# Patient Record
Sex: FEMALE | Race: WHITE | NOT HISPANIC OR LATINO | Employment: FULL TIME | URBAN - METROPOLITAN AREA
[De-identification: names, ages, dates, MRNs, and addresses within clinical notes are randomized per-mention and may not be internally consistent; named-entity substitution may affect disease eponyms.]

---

## 2024-03-29 LAB
EXTERNAL ABO GROUPING: NORMAL
EXTERNAL ANTIBODY SCREEN: NORMAL
EXTERNAL CHLAMYDIA SCREEN: NEGATIVE
EXTERNAL GONORRHEA SCREEN: NEGATIVE
EXTERNAL HEMATOCRIT: 38.8 %
EXTERNAL HEMOGLOBIN: 13.3 G/DL
EXTERNAL HEPATITIS B SURFACE ANTIGEN: NORMAL
EXTERNAL HIV-1 AB: NORMAL
EXTERNAL HIV-1 P24 ANTIGEN: NORMAL
EXTERNAL HIV-2 AB: NORMAL
EXTERNAL PLATELET COUNT: 230 K/ÂΜL
EXTERNAL RH FACTOR: POSITIVE
EXTERNAL SYPHILIS TOTAL IGG/IGM SCREENING: NORMAL
HCV AB SER-ACNC: NORMAL

## 2024-05-02 ENCOUNTER — ULTRASOUND (OUTPATIENT)
Dept: OBGYN CLINIC | Facility: CLINIC | Age: 34
End: 2024-05-02
Payer: COMMERCIAL

## 2024-05-02 ENCOUNTER — TELEPHONE (OUTPATIENT)
Dept: OBGYN CLINIC | Facility: CLINIC | Age: 34
End: 2024-05-02

## 2024-05-02 VITALS
HEIGHT: 64 IN | SYSTOLIC BLOOD PRESSURE: 128 MMHG | WEIGHT: 168 LBS | BODY MASS INDEX: 28.68 KG/M2 | DIASTOLIC BLOOD PRESSURE: 80 MMHG

## 2024-05-02 DIAGNOSIS — Z3A.17 17 WEEKS GESTATION OF PREGNANCY: Primary | ICD-10-CM

## 2024-05-02 PROBLEM — Z78.9 CONCEIVED BY IN VITRO FERTILIZATION: Status: ACTIVE | Noted: 2024-05-02

## 2024-05-02 LAB
SL AMB  POCT GLUCOSE, UA: NORMAL
SL AMB POCT URINE PROTEIN: NORMAL

## 2024-05-02 PROCEDURE — 81002 URINALYSIS NONAUTO W/O SCOPE: CPT | Performed by: OBSTETRICS & GYNECOLOGY

## 2024-05-02 PROCEDURE — PNV: Performed by: OBSTETRICS & GYNECOLOGY

## 2024-05-02 RX ORDER — ASPIRIN 81 MG/1
81 TABLET, CHEWABLE ORAL DAILY
COMMUNITY

## 2024-05-02 NOTE — TELEPHONE ENCOUNTER
5/2/24 patient has an upcoming Intake appointment 5/6/24 in [place] 8:30 with nurse 18 weeks and Initial appointment with Georgie in [place] 5/15/24 at 9:30 am. Please note patient has records on phone and she did request MR to be faxed to us

## 2024-05-02 NOTE — PROGRESS NOTES
.[facility] - [place]  [address] [place] [address]    Assessment/Plan:  1. 17 weeks gestation of pregnancy  Assessment & Plan:  Pt needs OB intake visit  MFM referral given  [facility] ordered    Orders:  -     Alpha fetoprotein, maternal; Future  -     Ambulatory Referral to Maternal Fetal Medicine; Future; Expected date: 2024  -     Alpha fetoprotein, maternal  -     POCT urine dip        Subjective:   Vinay Jason is a 33 y.o.  .  CC:   Chief Complaint   Patient presents with    Routine Prenatal Visit    Initial Prenatal Visit     OB-17 weeks, SOB with high heart rate. Had brown blood spotting on        HPI: 34yo  @ 17wks by IVF dating presents for transfer of care appointment. She recently moved from [place]. She reports no complaints and is doing well.     ROS: Negative except as noted in HPI    Patient's last menstrual period was 2024.       She  reports that she is not currently sexually active and has had partner(s) who are male. She reports using the following method of birth control/protection: None.       The following portions of the patient's history were reviewed and updated as appropriate:   Past Medical History:   Diagnosis Date    Abnormal Pap smear of cervix     Normal paps  and      History reviewed. No pertinent surgical history.  Family History   Problem Relation Age of Onset    Thyroid disease Mother         Graves disease    Colon cancer Maternal Grandmother         Passed from colon cancer age approx 75     Social History     Socioeconomic History    Marital status: Single     Spouse name: None    Number of children: None    Years of education: None    Highest education level: None   Occupational History    None   Tobacco Use    Smoking status: Never    Smokeless tobacco: Never    Tobacco comments:     No history with tobacco   Substance and Sexual Activity    Alcohol use: Not Currently     Comment: No alcohol use since 2022     "Drug use: Never    Sexual activity: Not Currently     Partners: Male     Birth control/protection: None   Other Topics Concern    None   Social History Narrative    None     Social Determinants of Health     Financial Resource Strain: Not on file   Food Insecurity: Not on file   Transportation Needs: Not on file   Physical Activity: Not on file   Stress: Not on file   Social Connections: Not on file   Intimate Partner Violence: Not on file   Housing Stability: Not on file     Outpatient Medications Marked as Taking for the 5/2/24 encounter (Ultrasound) with Cookie LOPEZ DO   Medication    aspirin 81 mg chewable tablet    Doxylamine Succinate, Sleep, (UNISOM PO)    Prenatal Vit-Fe Fumarate-FA (PRENATAL 19 PO)     Allergies   Allergen Reactions    Penicillins Hives and Shortness Of Breath           Objective:  /80 (BP Location: Left arm, Patient Position: Sitting, Cuff Size: Standard)   Ht 5' 4\" (1.626 m)   Wt 76.2 kg (168 lb)   LMP 01/04/2024   BMI 28.84 kg/m²          General Appearance: alert and oriented, in no acute distress.   Abdomen: Soft, non-tender, non-distended, no masses, no rebound or guarding.  FHT: 156  Extremities: Normal range of motion.   Skin: normal, no rash or abnormalities  Neurologic: alert, oriented x3  Psychiatric: Appropriate affect, mood stable, cooperative with exam.        Cookie Kulkarni DO  5/2/2024 10:52 AM   "

## 2024-05-06 ENCOUNTER — TELEPHONE (OUTPATIENT)
Age: 34
End: 2024-05-06

## 2024-05-06 ENCOUNTER — INITIAL PRENATAL (OUTPATIENT)
Dept: OBGYN CLINIC | Facility: CLINIC | Age: 34
End: 2024-05-06

## 2024-05-06 ENCOUNTER — TELEPHONE (OUTPATIENT)
Dept: OBGYN CLINIC | Facility: CLINIC | Age: 34
End: 2024-05-06

## 2024-05-06 ENCOUNTER — PATIENT OUTREACH (OUTPATIENT)
Dept: OBGYN CLINIC | Facility: CLINIC | Age: 34
End: 2024-05-06

## 2024-05-06 VITALS
HEIGHT: 64 IN | BODY MASS INDEX: 28.44 KG/M2 | DIASTOLIC BLOOD PRESSURE: 60 MMHG | SYSTOLIC BLOOD PRESSURE: 122 MMHG | WEIGHT: 166.6 LBS

## 2024-05-06 DIAGNOSIS — Z34.82 PRENATAL CARE, SUBSEQUENT PREGNANCY, SECOND TRIMESTER: ICD-10-CM

## 2024-05-06 DIAGNOSIS — Z3A.18 18 WEEKS GESTATION OF PREGNANCY: Primary | ICD-10-CM

## 2024-05-06 PROCEDURE — NOBC: Performed by: NURSE PRACTITIONER

## 2024-05-06 NOTE — PROGRESS NOTES
SW referred for initial prenatal assessment. Patient is a , 17w4d transfer from [facility] with an RY[place] of 10/10/24. Patient agreeable to meeting with SW today.    Patient reports this is a planned pregnancy (IVF - donor sperm). She is expecting a girl. Patient recently moved to [place] with her mom and daughter. Patient works full time and drives. Patient denies needing information for MA/WIC/SNAP, parenting education, or baby supply resources today.    Patient denies current or h/o substance use, DV/IPV, CYS involvement, and legal concerns. Denies having a formal mental health diagnosis but has struggled with anxiety, particularly after birth of her daughter. Reports feeling well this pregnancy and denies needing mental health support today, but SW and patient discussed options postpartum if needed. Patient indicates good social support from her mom and friends. Enjoys walking outside and gardening for stress relief.     No SW needs identified at this time, SW closing referral. Please re-refer as needed.

## 2024-05-06 NOTE — TELEPHONE ENCOUNTER
Left message patient's voicemail re: needs prenatal labs not done with initial labs (Rubella, U/A, urine C&S) - orders in chart for [facility].  Can verify lab with patient & fax to lab or patient.

## 2024-05-06 NOTE — PROGRESS NOTES
OB INTAKE INTERVIEW  Patient is 33 y.o.y.o. who presents for OB intake at 17 wks 4 days  She is accompanied by herself during this encounter  The father of her baby unknown (IVF pregnancy with donor sperm) is not involved in the pregnancy and is 28 years old.      Last Menstrual Period: 2024  Ultrasound: Measured - awaiting IVF records - patient brought records from OB - had appts 3/1/2024 & 3/29/2024 ([place], [place])  Estimated Date of Delivery: 10/10/2024 confirmed by 1st trimester US     Signs/Symptoms of Pregnancy  Current pregnancy symptoms: nausea, vomiting 2x/wk with taking Unisom  Reviewed dietary recommendations in pregnancy - patient does not eat red meat  Constipation no  Headaches no  Cramping/spotting YES - spotting @ 7-9 wks  PICA cravings no    Diabetes-  There is no height or weight on file to calculate BMI.  If patient has 1 or more, please order early 1 hour GTT  History of GDM no  BMI >35 no  History of PCOS or current metformin use no  History of LGA/macrosomic infant (4000g/9lbs) no    If patient has 2 or more, please order early 1 hour GTT  BMI>30 no  AMA no  First degree relative with type 2 diabetes no  History of chronic HTN, hyperlipidemia, elevated A1C no  High risk race (, , ,  or ) no    Hypertension- if you answer yes to any of the following, please order baseline preeclampsia labs (cbc, comprehensive metabolic panel, urine protein creatinine ratio, uric acid)  History of of chronic HTN no  History of gestational HTN no  History of preeclampsia, eclampsia, or HELLP syndrome no  History of diabetes no  History of lupus, autoimmune disease, kidney disease no    Thyroid- if yes order TSH with reflex T4  History of thyroid disease no    Bleeding Disorder or Hx of DVT-patient or first degree relative with history of. Order the following if not done previously.   (Factor V, antithrombin III, prothrombin gene mutation, protein  C and S Ag, lupus anticoagulant, anticardiolipin, beta-2 glycoprotein)   no    OB/GYN-  History of abnormal pap smear YES       Date of last pap smear 2024 - wnl, reactive changes, (-) HPV  History of HPV YES  History of Herpes/HSV no  History of other STI (gonorrhea, chlamydia, trich) no  History of prior  YES  History of prior  no  History of  delivery prior to 36 weeks 6 days no  History of blood transfusion no  Ok for blood transfusion YES    Substance screening-   History of tobacco use no  Currently using tobacco no  Substance Use Screen Level (N/A, LOW, HIGH) N/A    MRSA Screening-   Does the pt have a hx of MRSA? no    Immunizations:  Influenza vaccine given this season YES  Discussed Tdap vaccine YES  Discussed COVID Vaccine had Covid vaccine 2023    Genetic/MFM-  Do you or your partner have a history of any of the following in yourselves or first degree relatives?  Cystic fibrosis no  Spinal muscular atrophy no  Hemoglobinopathy/Sickle Cell/Thalassemia no  Fragile X Intellectual Disability no    If yes, discuss Carrier Screening and recommend consultation with MFM/Genetic Counseling and place specific [facility] Referral for.    If no, discuss Carrier Screening being completed once in a lifetime as a standard of care lab test. Place orders for Cystic Fibrosis Gene Test ([id number]) and Spinal Muscular Atrophy DNA ([id number])      Appointment for Nuchal Translucency Ultrasound at [facility] scheduled for - patient did not have NT US - had NIPT      Interview education  [facility] Pregnancy Essentials Book reviewed, discussed and attached to their AVS YES    referral to social work placed YES    Prenatal lab work scripts NO (initial prenatal labs previously done)  Extra labs ordered:  Has lab order for MSAFP    Aspirin/Preeclampsia Screen    Risk Level Risk Factor Recommendation   LOW Prior Uncomplicated full-term delivery YES No Aspirin recommendation        MODERATE Nulliparity no Recommend  low-dose aspirin if     BMI>30 no 2 or more moderate risk factors    Family History Preeclampsia (mother/sister) no     35yr old or greater no      or Low Socioeconomic no     IVF Pregnancy  YES     Personal History Risks (low birth weight, prior adverse preg outcome, >10yr preg interval) no         HIGH History of Preeclampsia no Recommend low-dose aspirin if     Multifetal gestation no 1 or more high risk factors    Chronic HTN no     Type 1 or 2 Diabetes no     Renal Disease no     Autoimmune Disease  no      Contraindications to ASA therapy:  NSAID/ ASA allergy: no  Nasal polyps: no  Asthma with history of ASA induced bronchospasm: no  Relative contraindications:  History of GI bleed: no  Active peptic ulcer disease: no  Severe hepatic dysfunction: no    Patient should be recommended to take ASA 162mg during this pregnancy from 12-36wks to lower her risk of preeclampsia: patient is currently taking LDASA 81 mg/day from previous provider- will follow up with provider next appt      The patient has a history now or in prior pregnancy notable for:  - transfer @ 17 wk  - IVF pregnancy - transfer date 1/24/2024 - EDC 10/10?2024   -1st preg IUI, this pregnancy IVF with same donor sperm (donor age 28) - patient brought records from previous OB provider, awaiting records from IVF  - had 1st trimester bleeding 7-9 wks - subchorionic bleeding - resolved  - had initial prenatal labs done 3/29/2024 - blood type/Rh is O POS, NEGATIVE VARICELLA  - patient will schedule detailed US @ [facility] for 18-20 wk  - will have MSAFP done (has lab order)  - PCN allergy  - patient will establish with dentist in this area - last dental cleaning 2 yrs ago.      Details that I feel the provider should be aware of: see above  - patient has recently moved to this area from [facility] - lives with her mother (her support person) - has 18 month old daughter  - review of prenatal records - NO RUBELLA, U/A, urine culture done  with initial prenatal labs - ordered ([facility])    PN1 visit scheduled. The patient was oriented to our practice, the navigator role, reviewed delivering physicians and [facility] for Delivery. All questions were answered.    Interviewed by: JACK Prieto RN

## 2024-05-06 NOTE — PATIENT INSTRUCTIONS
Congratulation!! Please review our Pregnancy Essential Guide and [facility] L&D Virtual tour from our networks website.     [facility] Pregnancy Essentials Guide  [facility] ([email or web address])     [facility] - Virtual Tour ([employer])

## 2024-05-07 NOTE — TELEPHONE ENCOUNTER
Left message patient's voicemail re: labs not done with initial prenatal labs previous OB - mailed lab order for U/A, urine C7S & Rubella to patient.

## 2024-05-15 ENCOUNTER — INITIAL PRENATAL (OUTPATIENT)
Dept: OBGYN CLINIC | Facility: CLINIC | Age: 34
End: 2024-05-15
Payer: COMMERCIAL

## 2024-05-15 VITALS — DIASTOLIC BLOOD PRESSURE: 60 MMHG | WEIGHT: 168.6 LBS | BODY MASS INDEX: 28.94 KG/M2 | SYSTOLIC BLOOD PRESSURE: 110 MMHG

## 2024-05-15 DIAGNOSIS — Z3A.18 18 WEEKS GESTATION OF PREGNANCY: Primary | ICD-10-CM

## 2024-05-15 DIAGNOSIS — Z78.9 CONCEIVED BY IN VITRO FERTILIZATION: ICD-10-CM

## 2024-05-15 LAB
SL AMB  POCT GLUCOSE, UA: NEGATIVE
SL AMB POCT URINE PROTEIN: NEGATIVE

## 2024-05-15 PROCEDURE — PNV: Performed by: NURSE PRACTITIONER

## 2024-05-15 PROCEDURE — 81002 URINALYSIS NONAUTO W/O SCOPE: CPT | Performed by: NURSE PRACTITIONER

## 2024-05-15 RX ORDER — CETIRIZINE HYDROCHLORIDE 10 MG/1
10 TABLET ORAL AS NEEDED
COMMUNITY

## 2024-05-15 NOTE — PROGRESS NOTES
Routine Prenatal Visit  [facility] - [place]  [address], [place] [address]    Assessment/Plan:  Vinay is a 33 y.o. year old  at 18w6d who presents for routine prenatal visit.     1. 18 weeks gestation of pregnancy  -     POCT urine dip  2. Conceived by in vitro fertilization    Doing well  Anatomy scan scheduled  Discussed AFP, also needs Rubella and urine culture. Requisition reprinted. Pt will to lab go this week.  Call with any concerns  Next OB Visit 4 weeks.    Subjective:     CC: Prenatal care    Vinay Jason is a 33 y.o.  female who presents for routine prenatal care at 18w6d. She is doing well. Having allergies to environment since moving from [place], taking Zyrtec.   Pregnancy ROS: no leakage of fluid, pelvic pain, or vaginal bleeding.  no fetal movement yet.     The following portions of the patient's history were reviewed and updated as appropriate: allergies, current medications, past family history, past medical history, obstetric history, gynecologic history, past social history, past surgical history and problem list.      Objective:  /60   Wt 76.5 kg (168 lb 9.6 oz)   LMP 2024   BMI 28.94 kg/m²   Pregravid Weight/BMI: 77.1 kg (170 lb) (BMI 29.17)  Current Weight: 76.5 kg (168 lb 9.6 oz)   Total Weight Gain: -0.635 kg (-1 lb 6.4 oz)   Pre- Vitals      Flowsheet Row Most Recent Value   Prenatal Assessment    Prenatal Vitals    Blood Pressure 110/60   Weight - Scale 76.5 kg (168 lb 9.6 oz)   Urine Albumin/Glucose    Dilation/Effacement/Station    Cervical Dilation 0   Cervical Effacement 0   Vaginal Drainage    Draining Fluid No   Edema    LLE Edema None   RLE Edema Trace             General: Well appearing, no distress  Abdomen: Soft, gravid, nontender.  .  Extremities: Non tender.

## 2024-05-18 NOTE — PROGRESS NOTES
Please refer to the [facility] ultrasound report in Ob Procedures for additional information regarding today's visit

## 2024-05-20 ENCOUNTER — ROUTINE PRENATAL (OUTPATIENT)
Dept: PERINATAL CARE | Facility: OTHER | Age: 34
End: 2024-05-20
Payer: COMMERCIAL

## 2024-05-20 VITALS
HEIGHT: 64 IN | DIASTOLIC BLOOD PRESSURE: 68 MMHG | HEART RATE: 91 BPM | WEIGHT: 169 LBS | BODY MASS INDEX: 28.85 KG/M2 | SYSTOLIC BLOOD PRESSURE: 106 MMHG

## 2024-05-20 DIAGNOSIS — Z3A.19 19 WEEKS GESTATION OF PREGNANCY: ICD-10-CM

## 2024-05-20 DIAGNOSIS — O09.812 PREGNANCY RESULTING FROM IN VITRO FERTILIZATION IN SECOND TRIMESTER: Primary | ICD-10-CM

## 2024-05-20 DIAGNOSIS — Z3A.17 17 WEEKS GESTATION OF PREGNANCY: ICD-10-CM

## 2024-05-20 DIAGNOSIS — Z36.86 ENCOUNTER FOR ANTENATAL SCREENING FOR CERVICAL LENGTH: ICD-10-CM

## 2024-05-20 PROCEDURE — 76811 OB US DETAILED SNGL FETUS: CPT | Performed by: OBSTETRICS & GYNECOLOGY

## 2024-05-20 PROCEDURE — 76817 TRANSVAGINAL US OBSTETRIC: CPT | Performed by: OBSTETRICS & GYNECOLOGY

## 2024-05-20 PROCEDURE — 99204 OFFICE O/P NEW MOD 45 MIN: CPT | Performed by: OBSTETRICS & GYNECOLOGY

## 2024-05-20 NOTE — LETTER
May 20, 2024     Cookie Kulkarni V,   [address] [address]    Patient: Vinay Jason   YOB: 1990   Date of Visit: 5/20/2024       Dear Dr. Kulkarni:    Thank you for referring Vinay Jason to me for evaluation. Below are my notes for this consultation.    If you have questions, please do not hesitate to call me. I look forward to following your patient along with you.         Sincerely,        Gio Bass MD        CC: No Recipients    Gio Bass MD  5/20/2024 10:44 AM  Sign when Signing Visit  Please refer to the [facility] ultrasound report in Ob Procedures for additional information regarding today's visit

## 2024-05-20 NOTE — PROGRESS NOTES
Ultrasound Probe Disinfection    A transvaginal ultrasound was performed.   Prior to use, disinfection was performed with High Level Disinfection Process (Telnicon).  Probe serial number [id number] was used.      Magrie Calvert  05/20/24  10:05 AM

## 2024-05-22 LAB — EXTERNAL RUBELLA IGG QUANTITATION: NORMAL

## 2024-05-23 LAB
APPEARANCE UR: CLEAR
BACTERIA UR CULT: NORMAL
BACTERIA URNS QL MICRO: NORMAL
BILIRUB UR QL STRIP: NEGATIVE
CASTS URNS QL MICRO: NORMAL /LPF
COLOR UR: YELLOW
EPI CELLS #/AREA URNS HPF: NORMAL /HPF (ref 0–10)
GLUCOSE UR QL: NEGATIVE
HGB UR QL STRIP: NEGATIVE
KETONES UR QL STRIP: NEGATIVE
LEUKOCYTE ESTERASE UR QL STRIP: NEGATIVE
Lab: NORMAL
MICRO URNS: ABNORMAL
MICRO URNS: ABNORMAL
NITRITE UR QL STRIP: NEGATIVE
PH UR STRIP: 8 [PH] (ref 5–7.5)
PROT UR QL STRIP: NEGATIVE
RBC #/AREA URNS HPF: NORMAL /HPF (ref 0–2)
RUBV IGG SERPL IA-ACNC: 4.16 INDEX
SP GR UR: 1.01 (ref 1–1.03)
UROBILINOGEN UR STRIP-ACNC: 0.2 MG/DL (ref 0.2–1)
WBC #/AREA URNS HPF: NORMAL /HPF (ref 0–5)

## 2024-05-24 LAB
2ND TRIMESTER 4 SCREEN SERPL-IMP: NORMAL
AFP ADJ MOM SERPL: 1.14
AFP INTERP AMN-IMP: NORMAL
AFP INTERP SERPL-IMP: NORMAL
AFP INTERP SERPL-IMP: NORMAL
AFP SERPL-MCNC: 60.9 NG/ML
AGE AT DELIVERY: 33.3 YR
GA METHOD: NORMAL
GA: 19.9 WEEKS
IDDM PATIENT QL: NO
MULTIPLE PREGNANCY: NO
NEURAL TUBE DEFECT RISK FETUS: 7850 %

## 2024-06-11 ENCOUNTER — ROUTINE PRENATAL (OUTPATIENT)
Dept: OBGYN CLINIC | Facility: CLINIC | Age: 34
End: 2024-06-11
Payer: COMMERCIAL

## 2024-06-11 VITALS
SYSTOLIC BLOOD PRESSURE: 108 MMHG | HEIGHT: 64 IN | WEIGHT: 170.6 LBS | DIASTOLIC BLOOD PRESSURE: 66 MMHG | BODY MASS INDEX: 29.12 KG/M2

## 2024-06-11 DIAGNOSIS — Z78.9 CONCEIVED BY IN VITRO FERTILIZATION: ICD-10-CM

## 2024-06-11 DIAGNOSIS — Z3A.22 22 WEEKS GESTATION OF PREGNANCY: Primary | ICD-10-CM

## 2024-06-11 LAB
SL AMB  POCT GLUCOSE, UA: NORMAL
SL AMB POCT URINE PROTEIN: NORMAL

## 2024-06-11 PROCEDURE — PNV: Performed by: OBSTETRICS & GYNECOLOGY

## 2024-06-11 PROCEDURE — 81002 URINALYSIS NONAUTO W/O SCOPE: CPT | Performed by: OBSTETRICS & GYNECOLOGY

## 2024-06-11 NOTE — PROGRESS NOTES
"Routine Prenatal Visit  [facility] - [address], [address], [place] [address]    Assessment/Plan:  Vinay is a 34 y.o. year old  at 22w5d who presents for routine prenatal visit.     1. 22 weeks gestation of pregnancy  -     POCT urine dip  2. Conceived by in vitro fertilization  Comments:  fetal echo  scheduled    + fm off an on.  Reviewed with pt  eat and  quiet time and  see if movement present.    US reviewed   has fetal echo scheduled.    No bleeding     Subjective:     CC: Prenatal care    Vinay Jason is a 34 y.o.  female who presents for routine prenatal care at 22w5d.  Pregnancy ROS: no  leakage of fluid, pelvic pain, or vaginal bleeding.  +  fetal movement.    The following portions of the patient's history were reviewed and updated as appropriate: allergies, current medications, past family history, past medical history, obstetric history, gynecologic history, past social history, past surgical history and problem list.      Objective:  /66 (BP Location: Left arm, Patient Position: Sitting, Cuff Size: Standard)   Ht 5' 4\" (1.626 m)   Wt 77.4 kg (170 lb 9.6 oz)   LMP 2024   BMI 29.28 kg/m²   Pregravid Weight/BMI: 77.1 kg (170 lb) (BMI 29.17)  Current Weight: 77.4 kg (170 lb 9.6 oz)   Total Weight Gain: 0.272 kg (9.6 oz)   Pre- Vitals    Flowsheet Row Most Recent Value   Prenatal Assessment    Fetal Heart Rate 144   Fundal Height (cm) 23 cm   Prenatal Vitals    Blood Pressure 108/66   Weight - Scale 77.4 kg (170 lb 9.6 oz)   Urine Albumin/Glucose    Dilation/Effacement/Station    Vaginal Drainage    Draining Fluid No   Edema    LLE Edema None   RLE Edema None   Facial Edema None           General: Well appearing, no distress  Respiratory: Unlabored breathing  Cardiovascular: Regular rate.  Abdomen: Soft, gravid, nontender  Fundal Height: Appropriate for gestational age.  Extremities: Warm and well perfused.  Non tender.  "

## 2024-06-11 NOTE — PATIENT INSTRUCTIONS
NUTRITION IN PREGNANCY  Good Nutrition is a VERY important part of having a healthy pregnancy and healthy baby.  You should follow a healthy diet which include the following:   * Vegetables (which are dark green and leafy): at least 2 servings each day   * Protein (meat, eggs, beans, nuts, peanut butter): 3-4 servings each day   * Breads/whole grains (bread, pasta, rice, tortillas, potatoes): 3 servings each day   * Dairy (milk, yogurt, cheese): 3-4 servings each day   * Water: 6-8 glasses per day   * Calories: approximately 2000 to 2200 calories per day     WEIGHT GAIN   Recommended weight gain for you during your pregnancy is based on your body mass index (BMI) at the time that you became pregnant.   Pre-pregnant BMI Recommended weight gain   Underweight (BMI less than 18.5) 28 to 40 pounds   Normal weight (BMI 18.5-24.9) 25 to 35 pounds   Overweight (BMI 25-29.9) 15 to 25 pounds   Obese (BMI 30 or greater) 11 to 20 pounds     FOOD SAFETY   It is VERY important to eat only safely-prepared foods during pregnancy as you and your baby have a higher risk than usual for being affected by foodborne illnesses.  Follow these steps to ensure that you and your baby are safe from foodborne illnesses while you are pregnant:   wash hands thoroughly with warm water and soap before and after handling any foods   wash cutting boards, dishes, utensils, and countertops with hot water and soap before and after preparing any foods   rinse raw fruits and vegetables thoroughly under running water before eating   keep raw meat and seafood separate from other foods and use different cutting boards/utensils to handle raw meat than for other foods   put cooked food on a freshly clean plate   cook all of your foods thoroughly   discard foods that have been left out for more than 2 hours   refrigerate or freeze any foods than can spoil     There are three particular foodborne risks that you should be aware of and avoid as they can cause  serious harm to your unborn child.     * Listeria (a harmful bacteria)   don’t eat hot dogs or deli meats (unless they’re reheated until steaming hot)   don’t eat soft cheeses (such as Feta, Brie, Camembert) unless they are specifically labeled as being “made with pasteurized milk”   don’t drink raw (unpasteurized) milk   don’t eat refrigerated pates or meat spreads   don’t eat refrigerated smoked seafood unless it’s in a cooked dish like a casserole     * Mercury (a metal which is found in certain fish in high levels)   don’t eat shark, tilefish, chasity mackerel, or swordfish   don’t eat more than 12 ounces per week of shrimp, salmon, pollock, or catfish   when eating tuna fish, you can have up to 6 ounces per week of canned albacore tuna OR up to 12 ounces of canned light tuna     * Toxoplasma (a harmful parasite)   cook meat thoroughly before eating   wear gloves when gardening or handling sand from a child’s sandbox   if you ha tve a cat, have someone else change the litter box while you are pregnant.    if you HAVE to clean it yourself, be sure to wash your hands thoroughly afterwards with warm water and soap.   don’t get a NEW cat while you are pregnant

## 2024-06-13 ENCOUNTER — ROUTINE PRENATAL (OUTPATIENT)
Dept: PERINATAL CARE | Facility: OTHER | Age: 34
End: 2024-06-13
Payer: COMMERCIAL

## 2024-06-13 VITALS
DIASTOLIC BLOOD PRESSURE: 50 MMHG | BODY MASS INDEX: 29.16 KG/M2 | HEIGHT: 64 IN | WEIGHT: 170.8 LBS | HEART RATE: 84 BPM | SYSTOLIC BLOOD PRESSURE: 90 MMHG

## 2024-06-13 DIAGNOSIS — O09.812 PREGNANCY RESULTING FROM IN VITRO FERTILIZATION IN SECOND TRIMESTER: Primary | ICD-10-CM

## 2024-06-13 DIAGNOSIS — Z3A.23 23 WEEKS GESTATION OF PREGNANCY: ICD-10-CM

## 2024-06-13 PROCEDURE — 76827 ECHO EXAM OF FETAL HEART: CPT | Performed by: OBSTETRICS & GYNECOLOGY

## 2024-06-13 PROCEDURE — 76825 ECHO EXAM OF FETAL HEART: CPT | Performed by: OBSTETRICS & GYNECOLOGY

## 2024-06-13 PROCEDURE — 99213 OFFICE O/P EST LOW 20 MIN: CPT | Performed by: OBSTETRICS & GYNECOLOGY

## 2024-06-13 PROCEDURE — 93325 DOPPLER ECHO COLOR FLOW MAPG: CPT | Performed by: OBSTETRICS & GYNECOLOGY

## 2024-06-13 NOTE — PROGRESS NOTES
The patient was seen today for an ultrasound.  Please see ultrasound report (located under Ob Procedures) for additional details.   Thank you very much for allowing us to participate in the care of this very nice patient.  Should you have any questions, please do not hesitate to contact me.     Jorje Acosta MD FACOG  Attending Physician, Maternal-Fetal Medicine  [facility]

## 2024-07-09 ENCOUNTER — ROUTINE PRENATAL (OUTPATIENT)
Dept: OBGYN CLINIC | Facility: CLINIC | Age: 34
End: 2024-07-09
Payer: COMMERCIAL

## 2024-07-09 VITALS
SYSTOLIC BLOOD PRESSURE: 108 MMHG | DIASTOLIC BLOOD PRESSURE: 72 MMHG | HEIGHT: 64 IN | BODY MASS INDEX: 29.74 KG/M2 | WEIGHT: 174.2 LBS

## 2024-07-09 DIAGNOSIS — Z36.89 ENCOUNTER FOR OTHER SPECIFIED ANTENATAL SCREENING: ICD-10-CM

## 2024-07-09 DIAGNOSIS — O09.812 PREGNANCY RESULTING FROM IN VITRO FERTILIZATION IN SECOND TRIMESTER: Primary | ICD-10-CM

## 2024-07-09 DIAGNOSIS — R00.2 INTERMITTENT PALPITATIONS: ICD-10-CM

## 2024-07-09 DIAGNOSIS — Z3A.26 26 WEEKS GESTATION OF PREGNANCY: ICD-10-CM

## 2024-07-09 PROBLEM — Z34.90 PREGNANCY WITH PRENATAL CARE ELSEWHERE: Status: ACTIVE | Noted: 2024-07-09

## 2024-07-09 PROBLEM — O09.819 PREGNANCY RESULTING FROM IN VITRO FERTILIZATION: Status: ACTIVE | Noted: 2024-06-13

## 2024-07-09 LAB
SL AMB  POCT GLUCOSE, UA: NORMAL
SL AMB POCT URINE PROTEIN: NORMAL

## 2024-07-09 PROCEDURE — PNV: Performed by: STUDENT IN AN ORGANIZED HEALTH CARE EDUCATION/TRAINING PROGRAM

## 2024-07-09 PROCEDURE — 81002 URINALYSIS NONAUTO W/O SCOPE: CPT | Performed by: STUDENT IN AN ORGANIZED HEALTH CARE EDUCATION/TRAINING PROGRAM

## 2024-07-09 NOTE — ASSESSMENT & PLAN NOTE
- Patient reports intermittent palpitations to the 150s, which she feels are triggered by certain foods. She reports associated breathlessness with these episodes.   - Given frequent palpitations, recommend consultation with Cardiology. Referral provided today.   - Discussed that if she has chest pain or near-syncope with these episodes, she should proceed to ER for evaluation.

## 2024-07-09 NOTE — ASSESSMENT & PLAN NOTE
- PTL/PPROM/Bleeding precautions given.   - Kick counts were discussed at length. Fetal movement was perceived while in office today.   - MFM consult report from level II ultrasound reviewed. Repeat US is scheduled in 3rd trimester, per MFM recommendations.  - 28 week labs ordered, lab requisition provided to patient. Patient is Rh positive.  - Problem list updated, results console reviewed and updated with pertinent prenatal labs.  - PMH, PSH, medications reviewed and updated as needed.  - Return to office in 2 wk(s) for routine prenatal care

## 2024-07-09 NOTE — PROGRESS NOTES
Routine Prenatal Visit  [facility] - [place]  [address] [place] [address]    Assessment/Plan:  Vinay is a 34 y.o. year old  at 26w5d who presents for routine prenatal visit.     1. Pregnancy resulting from in vitro fertilization in second trimester  Assessment & Plan:  - Contine low dose ASA until 36 weeks for pre-eclampsia risk reduction.   - Normal fetal echo  - Plan for 3rd trimester growth US and weekly APFS beginning at 36 weeks.       2. Intermittent palpitations  Assessment & Plan:  - Patient reports intermittent palpitations to the 150s, which she feels are triggered by certain foods. She reports associated breathlessness with these episodes.   - Given frequent palpitations, recommend consultation with Cardiology. Referral provided today.   - Discussed that if she has chest pain or near-syncope with these episodes, she should proceed to ER for evaluation.   Orders:  -     Ambulatory Referral to Cardiology; Future  3. 26 weeks gestation of pregnancy  Assessment & Plan:  - PTL/PPROM/Bleeding precautions given.   - Kick counts were discussed at length. Fetal movement was perceived while in office today.   - MFM consult report from level II ultrasound reviewed. Repeat US is scheduled in 3rd trimester, per MFM recommendations.  - 28 week labs ordered, lab requisition provided to patient. Patient is Rh positive.  - Problem list updated, results console reviewed and updated with pertinent prenatal labs.  - PMH, PSH, medications reviewed and updated as needed.  - Return to office in 2 wk(s) for routine prenatal care    Orders:  -     POCT urine dip  -     Ambulatory Referral to Cardiology; Future  4. Encounter for other specified  screening  -     Glucose, 1H PG; Future  -     CBC; Future  -     RPR, Rfx Qn RPR/Confirm TP; Future  -     Glucose, 1H PG  -     CBC  -     RPR, Rfx Qn RPR/Confirm TP        Subjective:   Vinay Jason is a 34 y.o.  who presents for routine  "prenatal care at 26w5d.  Complaints today: Subjective change in quality of fetal movements. Frequent palpitations with HR as high as 150. She reports that these episodes are associated with certain foods and cold water.   LOF: No; VB: No; Contractions: No; FM: Present    Objective:  /72 (BP Location: Left arm, Patient Position: Sitting, Cuff Size: Standard)   Ht 5' 4\" (1.626 m)   Wt 79 kg (174 lb 3.2 oz)   LMP 2024   BMI 29.90 kg/m²     General: Well appearing, no distress  Respiratory: Unlabored breathing  Cardiovascular: Regular rate.  Abdomen: Soft, gravid, nontender  Extremities: Warm and well perfused.  Non tender.    Pregravid Weight/BMI: 77.1 kg (170 lb) (BMI 29.17)  Current Weight: 79 kg (174 lb 3.2 oz)   Total Weight Gain: 1.905 kg (4 lb 3.2 oz)     Pre-Real Vitals      Flowsheet Row Most Recent Value   Prenatal Assessment    Fetal Heart Rate 150   Fundal Height (cm) 26 cm   Movement Present   Prenatal Vitals    Blood Pressure 108/72   Weight - Scale 79 kg (174 lb 3.2 oz)   Urine Albumin/Glucose    Dilation/Effacement/Station    Vaginal Drainage    Draining Fluid No   Edema    LLE Edema None   RLE Edema None   Facial Edema None             Santosh Masters MD  2024 9:39 AM   "

## 2024-07-09 NOTE — ASSESSMENT & PLAN NOTE
- Contine low dose ASA until 36 weeks for pre-eclampsia risk reduction.   - Normal fetal echo  - Plan for 3rd trimester growth US and weekly APFS beginning at 36 weeks.

## 2024-07-16 ENCOUNTER — HOSPITAL ENCOUNTER (OUTPATIENT)
Facility: HOSPITAL | Age: 34
Discharge: HOME | End: 2024-07-16
Attending: OBSTETRICS & GYNECOLOGY | Admitting: OBSTETRICS & GYNECOLOGY
Payer: COMMERCIAL

## 2024-07-16 VITALS
RESPIRATION RATE: 18 BRPM | OXYGEN SATURATION: 96 % | TEMPERATURE: 98.4 F | HEART RATE: 90 BPM | DIASTOLIC BLOOD PRESSURE: 71 MMHG | SYSTOLIC BLOOD PRESSURE: 108 MMHG

## 2024-07-16 PROBLEM — O36.8121 DECREASED FETAL MOVEMENT AFFECTING MANAGEMENT OF PREGNANCY IN SECOND TRIMESTER, FETUS 1: Status: ACTIVE | Noted: 2024-07-16

## 2024-07-16 PROCEDURE — NC001 PR NO CHARGE: Performed by: OBSTETRICS & GYNECOLOGY

## 2024-07-16 PROCEDURE — 59025 FETAL NON-STRESS TEST: CPT | Performed by: OBSTETRICS & GYNECOLOGY

## 2024-07-16 PROCEDURE — 99213 OFFICE O/P EST LOW 20 MIN: CPT

## 2024-07-17 NOTE — H&P
L&D Triage Note - OB/GYN  Vinay Jason 34 y.o. female MRN: [id number]  Unit/Bed#:  TRIAGE 2 Encounter: [id number]    Vinay Jason is a patient of [facility]    SUBJECTIVE    RYLAND: Estimated Date of Delivery: 10/10/24    HPI:  34 y.o.  27w5d presents with complaint of decreased fetal movement since this afternoon..     Contractions: none  Leakage of fluid: none  Vaginal Bleeding: none   Fetal movement: present but significantly less    Her current obstetrical history is significant for transfer of care at 17 weeks, IVF frozen cycle      ROS:  Constitutional: Negative for fever/chills/headache  Respiratory: Negative for cough/cold/SOB  Cardiovascular: Negative for palpitations/lower extremity edema    Gastrointestinal: Negative for nausea/vomit/diarrhea    OBJECTIVE:  /71 (BP Location: Right arm)   Pulse 90   Temp 98.4 °F (36.9 °C) (Oral)   Resp 18   LMP 2024   SpO2 96%   There is no height or weight on file to calculate BMI.    Physical Exam  Vitals and nursing note reviewed.   Constitutional:       General: She is not in acute distress.     Appearance: Normal appearance. She is well-developed.   HENT:      Head: Normocephalic and atraumatic.   Eyes:      General: No scleral icterus.  Cardiovascular:      Rate and Rhythm: Normal rate.      Pulses: Normal pulses.   Pulmonary:      Effort: Pulmonary effort is normal. No respiratory distress.      Breath sounds: No wheezing.   Abdominal:      Palpations: Abdomen is soft. There is mass (gravid 27 week uterus).      Tenderness: There is no abdominal tenderness.   Musculoskeletal:         General: No swelling.      Cervical back: Neck supple.      Right lower leg: No edema.      Left lower leg: No edema.   Skin:     General: Skin is warm and dry.      Capillary Refill: Capillary refill takes less than 2 seconds.      Findings: No lesion or rash.   Neurological:      Mental Status: She is alert and oriented to person, place, and time.    Psychiatric:         Mood and Affect: Mood normal.         Behavior: Behavior normal.         SVE: deferred - no abdominal complaints       FHT:  Baseline Rate (FHR): 140 bpm  Variability: Moderate  Accelerations: 15 x 15 or greater, At variable times  Decelerations: None  FHR Category: Category I    TOCO:   Contraction Frequency (minutes): 0  Contraction Duration (seconds): 0    IMAGING:      Placenta: fundal   Presentation: Hernandez Breech - head in RUQ and feet tucked into fundal placenta    Labs: No results found for this or any previous visit (from the past 24 hour(s)).      ASSESSMENT  Vinay Jason is a 34 y.o.  at 27w5d with decreased fetal movement - now feeling reassured;  able to feel movement more with fetal head turning in RUQ    PLAN  #1. 27W5D gestation:   Fetal nonstress test reassuring    #2. Decreased fetal movement:   Fetal position has changed from last week when had echo - now Hernandez breech and with watching US screen, she is able to feel movement in different location (RUQ) usually when baby rolls or turns head;  reviewed fetal movement counts and when to call    #3. Discharge instructions  Patient instructed to call if experiencing worsening contractions, vaginal bleeding, loss of fluid or decreased fetal movement.  Will follow up with OBGYN as scheduled.    D/w Dr. Jay via Secure Chat - covering for Navin Luciano MD  OB Hospitalist  [phone]  2024  9:13 PM     For information on Fall & Injury Prevention, visit [email or web address]

## 2024-07-17 NOTE — PROCEDURES
Vinay Jason, a  at 27w5d with an RYLAND of 10/10/2024, Date entered prior to episode creation, was seen at [facility] LABOR AND DELIVERY for the following procedure(s): $Procedure Type: NST]    Nonstress Test  Reason for NST: Decreased Fetal Movement  Variability: Moderate  Decelerations: None  Accelerations: Yes  Acoustic Stimulator: No  Baseline: 140 BPM  Uterine Irritability: No  Contractions: Not present  Contraction Frequency (minutes): 0 min         Interpretation  Nonstress Test Interpretation: Reactive  Overall Impression: Reassuring  Comments: fetus neida breech with head in RUQ and feet in fundal placenta

## 2024-07-19 ENCOUNTER — PATIENT MESSAGE (OUTPATIENT)
Dept: OBGYN CLINIC | Facility: CLINIC | Age: 34
End: 2024-07-19

## 2024-07-19 LAB
EXTERNAL HEMOGLOBIN: 11.1 G/DL
EXTERNAL PLATELET COUNT: 190 K/ΜL
EXTERNAL SYPHILIS TOTAL IGG/IGM SCREENING: NORMAL

## 2024-07-19 NOTE — PATIENT COMMUNICATION
Presuming symptoms have resolved, she needs to see Cardiology as scheduled on 8/9. Will await results of labs. If symptoms recur, she needs to proceed to ER for evaluation.     Santosh Masters MD  7/19/2024 2:29 PM

## 2024-07-20 LAB
BASOPHILS # BLD AUTO: 0 X10E3/UL (ref 0–0.2)
BASOPHILS NFR BLD AUTO: 0 %
EOSINOPHIL # BLD AUTO: 0.1 X10E3/UL (ref 0–0.4)
EOSINOPHIL NFR BLD AUTO: 1 %
ERYTHROCYTE [DISTWIDTH] IN BLOOD BY AUTOMATED COUNT: 11.7 % (ref 11.7–15.4)
GLUCOSE 1H P 50 G GLC PO SERPL-MCNC: 106 MG/DL (ref 70–139)
HCT VFR BLD AUTO: 33 % (ref 34–46.6)
HGB BLD-MCNC: 11.1 G/DL (ref 11.1–15.9)
IMM GRANULOCYTES # BLD: 0.1 X10E3/UL (ref 0–0.1)
IMM GRANULOCYTES NFR BLD: 1 %
LYMPHOCYTES # BLD AUTO: 1.6 X10E3/UL (ref 0.7–3.1)
LYMPHOCYTES NFR BLD AUTO: 20 %
MCH RBC QN AUTO: 29.8 PG (ref 26.6–33)
MCHC RBC AUTO-ENTMCNC: 33.6 G/DL (ref 31.5–35.7)
MCV RBC AUTO: 89 FL (ref 79–97)
MONOCYTES # BLD AUTO: 0.5 X10E3/UL (ref 0.1–0.9)
MONOCYTES NFR BLD AUTO: 6 %
NEUTROPHILS # BLD AUTO: 6 X10E3/UL (ref 1.4–7)
NEUTROPHILS NFR BLD AUTO: 72 %
PLATELET # BLD AUTO: 190 X10E3/UL (ref 150–450)
RBC # BLD AUTO: 3.73 X10E6/UL (ref 3.77–5.28)
RPR SER QL: NON REACTIVE
WBC # BLD AUTO: 8.3 X10E3/UL (ref 3.4–10.8)

## 2024-07-25 ENCOUNTER — ROUTINE PRENATAL (OUTPATIENT)
Dept: OBGYN CLINIC | Facility: CLINIC | Age: 34
End: 2024-07-25
Payer: COMMERCIAL

## 2024-07-25 VITALS
SYSTOLIC BLOOD PRESSURE: 108 MMHG | BODY MASS INDEX: 29.19 KG/M2 | HEIGHT: 64 IN | WEIGHT: 171 LBS | DIASTOLIC BLOOD PRESSURE: 76 MMHG

## 2024-07-25 DIAGNOSIS — Z3A.29 29 WEEKS GESTATION OF PREGNANCY: ICD-10-CM

## 2024-07-25 DIAGNOSIS — O09.813 PREGNANCY RESULTING FROM IN VITRO FERTILIZATION IN THIRD TRIMESTER: Primary | ICD-10-CM

## 2024-07-25 DIAGNOSIS — Z23 NEED FOR TDAP VACCINATION: ICD-10-CM

## 2024-07-25 LAB
SL AMB  POCT GLUCOSE, UA: NORMAL
SL AMB POCT URINE PROTEIN: NORMAL

## 2024-07-25 PROCEDURE — PNV: Performed by: OBSTETRICS & GYNECOLOGY

## 2024-07-25 PROCEDURE — 90471 IMMUNIZATION ADMIN: CPT | Performed by: OBSTETRICS & GYNECOLOGY

## 2024-07-25 PROCEDURE — 90715 TDAP VACCINE 7 YRS/> IM: CPT | Performed by: OBSTETRICS & GYNECOLOGY

## 2024-07-25 PROCEDURE — 81002 URINALYSIS NONAUTO W/O SCOPE: CPT | Performed by: OBSTETRICS & GYNECOLOGY

## 2024-07-26 PROBLEM — O09.813 PREGNANCY RESULTING FROM IN VITRO FERTILIZATION IN THIRD TRIMESTER: Status: ACTIVE | Noted: 2024-06-13

## 2024-07-26 PROBLEM — Z3A.29 29 WEEKS GESTATION OF PREGNANCY: Status: ACTIVE | Noted: 2024-05-02

## 2024-07-26 NOTE — PROGRESS NOTES
"Routine Prenatal Visit  [facility] - [place]  [address] [place] [address]    Assessment/Plan:  Vinay is a 34 y.o. year old  at 29w0d who presents for routine prenatal visit.     1. Pregnancy resulting from in vitro fertilization in third trimester  2. 29 weeks gestation of pregnancy  Assessment & Plan:  Reviewed normal 28 week labs.  Orders:  -     POCT urine dip  3. Need for Tdap vaccination  -     TDAP VACCINE GREATER THAN OR EQUAL TO 6YO IM        Subjective:     CC: Prenatal care    Vinay Jason is a 34 y.o.  female who presents for routine prenatal care at 29w0d.  Pregnancy ROS: no leakage of fluid, pelvic pain, or vaginal bleeding.  normal fetal movement.    The following portions of the patient's history were reviewed and updated as appropriate: allergies, current medications, past family history, past medical history, obstetric history, gynecologic history, past social history, past surgical history and problem list.      Objective:  /76 (BP Location: Left arm, Patient Position: Sitting, Cuff Size: Standard)   Ht 5' 4\" (1.626 m)   Wt 77.6 kg (171 lb)   LMP 2024   BMI 29.35 kg/m²   Pregravid Weight/BMI: 77.1 kg (170 lb) (BMI 29.17)  Current Weight: 77.6 kg (171 lb)   Total Weight Gain: 0.454 kg (1 lb)   Pre- Vitals      Flowsheet Row Most Recent Value   Prenatal Assessment    Fetal Heart Rate 140   Fundal Height (cm) 30 cm   Movement Present   Presentation Vertex   Prenatal Vitals    Blood Pressure 108/76   Weight - Scale 77.6 kg (171 lb)   Urine Albumin/Glucose    Dilation/Effacement/Station    Vaginal Drainage    Edema              General: Well appearing, no distress  Respiratory: Unlabored breathing  Cardiovascular: Regular rate.  Abdomen: Soft, gravid, nontender  Fundal Height: Appropriate for gestational age.  Extremities: Warm and well perfused.  Non tender.  "

## 2024-08-06 ENCOUNTER — ROUTINE PRENATAL (OUTPATIENT)
Dept: OBGYN CLINIC | Facility: CLINIC | Age: 34
End: 2024-08-06
Payer: COMMERCIAL

## 2024-08-06 ENCOUNTER — TELEPHONE (OUTPATIENT)
Dept: OBGYN CLINIC | Facility: CLINIC | Age: 34
End: 2024-08-06

## 2024-08-06 VITALS
SYSTOLIC BLOOD PRESSURE: 112 MMHG | DIASTOLIC BLOOD PRESSURE: 72 MMHG | HEIGHT: 64 IN | BODY MASS INDEX: 29.33 KG/M2 | WEIGHT: 171.8 LBS

## 2024-08-06 DIAGNOSIS — R00.2 INTERMITTENT PALPITATIONS: ICD-10-CM

## 2024-08-06 DIAGNOSIS — Z3A.30 30 WEEKS GESTATION OF PREGNANCY: ICD-10-CM

## 2024-08-06 DIAGNOSIS — O09.813 PREGNANCY RESULTING FROM IN VITRO FERTILIZATION IN THIRD TRIMESTER: Primary | ICD-10-CM

## 2024-08-06 LAB
SL AMB  POCT GLUCOSE, UA: NORMAL
SL AMB POCT URINE PROTEIN: NORMAL

## 2024-08-06 PROCEDURE — 81002 URINALYSIS NONAUTO W/O SCOPE: CPT | Performed by: OBSTETRICS & GYNECOLOGY

## 2024-08-06 PROCEDURE — PNV: Performed by: OBSTETRICS & GYNECOLOGY

## 2024-08-06 NOTE — TELEPHONE ENCOUNTER
3rd trimester phone call - 30 wk 5 days    Patient had OB appointment today -  Left message patient's voicemail to recall office    Overall how are you feeling?     Compliant with routine OB appointments? YES - scheduled to 39 wk    Have you completed your 3rd trimester lab work? YES - completed 7/19/.2024    Have you reviewed the contents of 3rd trimester folder from office?    Have you decided on a pediatrician?     If yes, who     If no, what are you looking for and request sent for outreach.   Questions on paperwork to go back to office?    Questions on the baby birth certificate forms?     EPDS Score     Send link for the Hospital Readiness Video via [employer]

## 2024-08-06 NOTE — ASSESSMENT & PLAN NOTE
Reports they are improved since increasing her salt intake and gatorade. Discussed if completely resolved does not need to see cardio

## 2024-08-06 NOTE — PROGRESS NOTES
"Routine Prenatal Visit  [facility] - [place]  [address] [place] [address]    Assessment/Plan:  Vinay is a 34 y.o. year old  at 30w5d who presents for routine prenatal visit.     1. Pregnancy resulting from in vitro fertilization in third trimester  2. 30 weeks gestation of pregnancy  -     POCT urine dip  3. Intermittent palpitations  Assessment & Plan:  Reports they are improved since increasing her salt intake and gatorade. Discussed if completely resolved does not need to see cardio        Subjective:   Vinay Jason is a 34 y.o.  who presents for routine prenatal care at 30w5d.  Complaints today: Denies  LOF: -; VB: -; Contractions: -; FM: +    Objective:  /72 (BP Location: Left arm, Patient Position: Sitting, Cuff Size: Standard)   Ht 5' 4\" (1.626 m)   Wt 77.9 kg (171 lb 12.8 oz)   LMP 2024   BMI 29.49 kg/m²     General: Well appearing, no distress  Respiratory: Unlabored breathing  Abdomen: Soft, gravid, nontender  Extremities: Warm and well perfused.  Non tender.    Pregravid Weight/BMI: 77.1 kg (170 lb) (BMI 29.17)  Current Weight: 77.9 kg (171 lb 12.8 oz)   Total Weight Gain: 0.816 kg (1 lb 12.8 oz)     Pre- Vitals      Flowsheet Row Most Recent Value   Prenatal Assessment    Fetal Heart Rate 150   Fundal Height (cm) 30 cm   Movement Present   Prenatal Vitals    Blood Pressure 112/72   Weight - Scale 77.9 kg (171 lb 12.8 oz)   Urine Albumin/Glucose    Dilation/Effacement/Station    Vaginal Drainage    Edema              Shil JESSICA Kulkarni DO  2024 9:11 AM       "

## 2024-08-14 ENCOUNTER — ULTRASOUND (OUTPATIENT)
Dept: PERINATAL CARE | Facility: OTHER | Age: 34
End: 2024-08-14
Payer: COMMERCIAL

## 2024-08-14 VITALS
WEIGHT: 172.2 LBS | HEART RATE: 112 BPM | BODY MASS INDEX: 29.4 KG/M2 | DIASTOLIC BLOOD PRESSURE: 70 MMHG | HEIGHT: 64 IN | SYSTOLIC BLOOD PRESSURE: 110 MMHG

## 2024-08-14 DIAGNOSIS — Z3A.31 31 WEEKS GESTATION OF PREGNANCY: Primary | ICD-10-CM

## 2024-08-14 DIAGNOSIS — O09.813 PREGNANCY RESULTING FROM IN VITRO FERTILIZATION IN THIRD TRIMESTER: ICD-10-CM

## 2024-08-14 PROBLEM — O36.8121 DECREASED FETAL MOVEMENT AFFECTING MANAGEMENT OF PREGNANCY IN SECOND TRIMESTER, FETUS 1: Status: RESOLVED | Noted: 2024-07-16 | Resolved: 2024-08-14

## 2024-08-14 PROCEDURE — 99213 OFFICE O/P EST LOW 20 MIN: CPT | Performed by: OBSTETRICS & GYNECOLOGY

## 2024-08-14 PROCEDURE — 76816 OB US FOLLOW-UP PER FETUS: CPT | Performed by: OBSTETRICS & GYNECOLOGY

## 2024-08-14 NOTE — PROGRESS NOTES
The patient was seen today for an ultrasound.  Please see ultrasound report (located under Ob Procedures) for additional details.   Thank you very much for allowing us to participate in the care of this very nice patient.  Should you have any questions, please do not hesitate to contact me.     Jorje Acosta MD FACOG  Attending Physician, Maternal-Fetal Medicine  [facility]

## 2024-08-22 ENCOUNTER — ROUTINE PRENATAL (OUTPATIENT)
Dept: OBGYN CLINIC | Facility: CLINIC | Age: 34
End: 2024-08-22
Payer: COMMERCIAL

## 2024-08-22 ENCOUNTER — HOSPITAL ENCOUNTER (OUTPATIENT)
Facility: HOSPITAL | Age: 34
Discharge: HOME | End: 2024-08-22
Attending: OBSTETRICS & GYNECOLOGY | Admitting: OBSTETRICS & GYNECOLOGY
Payer: COMMERCIAL

## 2024-08-22 VITALS
WEIGHT: 171 LBS | BODY MASS INDEX: 29.19 KG/M2 | SYSTOLIC BLOOD PRESSURE: 112 MMHG | HEIGHT: 64 IN | DIASTOLIC BLOOD PRESSURE: 70 MMHG

## 2024-08-22 VITALS
HEART RATE: 105 BPM | TEMPERATURE: 98.4 F | SYSTOLIC BLOOD PRESSURE: 118 MMHG | RESPIRATION RATE: 18 BRPM | DIASTOLIC BLOOD PRESSURE: 73 MMHG

## 2024-08-22 DIAGNOSIS — Z3A.33 33 WEEKS GESTATION OF PREGNANCY: ICD-10-CM

## 2024-08-22 DIAGNOSIS — Z34.90 PREGNANCY WITH PRENATAL CARE ELSEWHERE, ANTEPARTUM: ICD-10-CM

## 2024-08-22 DIAGNOSIS — O09.813 PREGNANCY RESULTING FROM IN VITRO FERTILIZATION IN THIRD TRIMESTER: Primary | ICD-10-CM

## 2024-08-22 DIAGNOSIS — R00.2 INTERMITTENT PALPITATIONS: ICD-10-CM

## 2024-08-22 LAB
SL AMB  POCT GLUCOSE, UA: NORMAL
SL AMB POCT URINE PROTEIN: NORMAL

## 2024-08-22 PROCEDURE — PNV: Performed by: PHYSICIAN ASSISTANT

## 2024-08-22 PROCEDURE — 76818 FETAL BIOPHYS PROFILE W/NST: CPT | Performed by: OBSTETRICS & GYNECOLOGY

## 2024-08-22 PROCEDURE — 81002 URINALYSIS NONAUTO W/O SCOPE: CPT | Performed by: PHYSICIAN ASSISTANT

## 2024-08-22 PROCEDURE — 99213 OFFICE O/P EST LOW 20 MIN: CPT

## 2024-08-22 PROCEDURE — NC001 PR NO CHARGE: Performed by: OBSTETRICS & GYNECOLOGY

## 2024-08-22 NOTE — ASSESSMENT & PLAN NOTE
Reassured patient good FHT in the office. With decreased fetal movement in the last 2 days recommend evaluation at L&D for NST and reassurance. Patient appreciative of L&D evaluation as anxious regarding change in movement. On call doctor and charge nurse aware.     Return to office for ob check in 2 weeks.

## 2024-08-22 NOTE — PROGRESS NOTES
"Routine Prenatal Visit  [facility] - [address], [address], [place] [address]    Assessment/Plan:  Vinay is a 34 y.o. year old  at 33w0d who presents for routine prenatal visit.     1. Pregnancy resulting from in vitro fertilization in third trimester  2. 33 weeks gestation of pregnancy  Assessment & Plan:  Reassured patient good FHT in the office. With decreased fetal movement in the last 2 days recommend evaluation at L&D for NST and reassurance. Patient appreciative of L&D evaluation as anxious regarding change in movement. On call doctor and charge nurse aware.     Return to office for ob check in 2 weeks.   Orders:  -     POCT urine dip  3. Intermittent palpitations  4. OB Transfer of care at 17 weeks from [facility]      Next OB Visit 2 weeks.    Subjective:     CC: Prenatal care    Vinay Jason is a 34 y.o.  female who presents for routine prenatal care at 33w0d.  Pregnancy ROS: Reports decreased fetal movement in the last 2 days. Usually baby is very active. Yesterday did not feel much movement at all. Sat down and had fluids and ice and felt faint movements not typical of normal, just got to 10 in 2 hrs. This am not getting 10 kicks in 2 hours and still faint movements. No N/V, HA, cramping, VB, LOF, edema, domestic violence, or smoking. Tolerating PNV.        The following portions of the patient's history were reviewed and updated as appropriate: allergies, current medications, past family history, past medical history, obstetric history, gynecologic history, past social history, past surgical history and problem list.      Objective:  /70 (BP Location: Left arm, Patient Position: Sitting, Cuff Size: Standard)   Ht 5' 4\" (1.626 m)   Wt 77.6 kg (171 lb)   LMP 2024   BMI 29.35 kg/m²   Pregravid Weight/BMI: 77.1 kg (170 lb) (BMI 29.17)  Current Weight: 77.6 kg (171 lb)   Total Weight Gain: 0.454 kg (1 lb)   Pre-Real Vitals      Flowsheet Row Most " Recent Value   Prenatal Assessment    Fetal Heart Rate 156   Fundal Height (cm) 33 cm   Movement Decreased   Prenatal Vitals    Blood Pressure 112/70   Weight - Scale 77.6 kg (171 lb)   Urine Albumin/Glucose    Dilation/Effacement/Station    Vaginal Drainage    Edema    LLE Edema None   RLE Edema None   Facial Edema None             General: Well appearing, no distress  Abdomen: Soft, gravid, nontender  Fundal Height: Appropriate for gestational age.  Extremities: Warm and well perfused.  Non tender.

## 2024-08-22 NOTE — H&P
L&D Triage Note - OB/GYN  Vinay Jason 34 y.o. female MRN: [id number]  Unit/Bed#:  TRIAGE 1 Encounter: [id number]    Vinay Jason is a patient of [facility]    SUBJECTIVE    RYLAND: Estimated Date of Delivery: 10/10/24    HPI:  34 y.o.  33w0d presents with complaint of decreased fetal movement.     Contractions: none  Leakage of fluid: none  Vaginal Bleeding: none  Fetal movement: present but less than usual    Her current obstetrical history is significant for IVF      ROS:  Constitutional: Negative for fever/chills/headache  Respiratory: Negative for cough/cold/rhinorhea  Cardiovascular: Negative for lower extremity edema/palpitations    Gastrointestinal: Negative for nausea/vomit/diarrhea    OBJECTIVE:  /73 (BP Location: Right arm)   Pulse 105   Temp 98.4 °F (36.9 °C) (Oral)   Resp 18   LMP 2024   There is no height or weight on file to calculate BMI.    Physical Exam  Constitutional:       General: She is not in acute distress.     Appearance: She is normal weight. She is not ill-appearing.   HENT:      Head: Normocephalic.      Nose: Nose normal.   Eyes:      General: No scleral icterus.  Cardiovascular:      Pulses: Normal pulses.   Pulmonary:      Effort: Pulmonary effort is normal. No respiratory distress.      Breath sounds: No wheezing.   Abdominal:      General: Bowel sounds are normal.      Palpations: Abdomen is soft. There is mass (gravid uterus).   Musculoskeletal:      Cervical back: Neck supple.      Right lower leg: No edema.      Left lower leg: No edema.   Skin:     General: Skin is warm.      Capillary Refill: Capillary refill takes less than 2 seconds.      Findings: No lesion or rash.   Neurological:      General: No focal deficit present.      Mental Status: She is alert and oriented to person, place, and time.   Psychiatric:         Mood and Affect: Mood normal.         Speculum exam: deferred - no abdominal complaints      SVE: N/A       FHT:  Baseline Rate  (FHR): 145 bpm  Variability: Moderate  Accelerations: 15 x 15 or greater, At variable times  Decelerations: None    TOCO:   Contraction Frequency (minutes): n/a  Contraction Duration (seconds): 50  Contraction Intensity: Mild    IMAGING:           TAUS   JALEN      - Q1 1.46cm     - Q2 4.57cm     - Q3 1.28cm     - Q4 3.21cm     - Total: 10.52cm   Placenta: posterior   Presentation: cephalic    Labs:   Recent Results (from the past 24 hour(s))   POCT urine dip    Collection Time: 24  8:38 AM   Result Value Ref Range    POCT URINE PROTEIN neg     GLUCOSE, UA neg          ASSESSMENT  Vinay Jason is a 34 y.o.  at 33w0d with decreased fetal movement - now reassured    PLAN  #1. 33W0D gestation:   NST reactive, reassuring fetal testing    #2. Decreased fetal movement:   Now reassured;  BPP 10 out of 10    #3. Discharge instructions  Patient instructed to call if experiencing worsening contractions, vaginal bleeding, loss of fluid or decreased fetal movement.  Will follow up with OBGYN as scheduled    D/w Dr. Jay via [employer] - covering for [facility]      Shivani Luciano MD  OB Hospitalist  [phone]  2024  10:50 AM

## 2024-08-23 PROBLEM — O36.8130 DECREASED FETAL MOVEMENT AFFECTING MANAGEMENT OF PREGNANCY IN THIRD TRIMESTER: Status: ACTIVE | Noted: 2024-08-23

## 2024-08-23 NOTE — PROCEDURES
Vinay Jason, a  at 33w1d with an RYLAND of 10/10/2024, Date entered prior to episode creation, was seen at [facility] LABOR AND DELIVERY for the following procedure(s): $Procedure Type: BPP with NST]    Nonstress Test  Reason for NST: Decreased Fetal Movement  Variability: Moderate  Decelerations: None  Accelerations: Yes  Acoustic Stimulator: No  Baseline: 145 BPM  Uterine Irritability: No  Contractions: Not present    4 Quadrant JALEN  JALEN Q1 (cm): 1.5 cm  JALEN Q2 (cm): 4.6 cm  JALEN Q3 (cm): 1.3 cm  JALEN Q4 (cm): 3.2 cm  JALEN TOTAL (cm): 10.6 cm    Biophysical Profile  Fetal Breathin  Fetal Movement: 2  Fetal Tone: 2  Fluid Volume: 2  Nonstress Test: 2  Biophysical Profile Score (of 8): 8 /8  Biophysical Profile Score (of 10): 10 /10         Interpretation  Nonstress Test Interpretation: Reactive  Overall Impression: Reassuring

## 2024-09-05 ENCOUNTER — ROUTINE PRENATAL (OUTPATIENT)
Dept: OBGYN CLINIC | Facility: CLINIC | Age: 34
End: 2024-09-05
Payer: COMMERCIAL

## 2024-09-05 VITALS
WEIGHT: 172.4 LBS | BODY MASS INDEX: 29.43 KG/M2 | DIASTOLIC BLOOD PRESSURE: 66 MMHG | SYSTOLIC BLOOD PRESSURE: 110 MMHG | HEIGHT: 64 IN

## 2024-09-05 DIAGNOSIS — Z3A.35 35 WEEKS GESTATION OF PREGNANCY: Primary | ICD-10-CM

## 2024-09-05 DIAGNOSIS — Z34.90 PREGNANCY WITH PRENATAL CARE ELSEWHERE, ANTEPARTUM: ICD-10-CM

## 2024-09-05 DIAGNOSIS — O09.813 PREGNANCY RESULTING FROM IN VITRO FERTILIZATION IN THIRD TRIMESTER: ICD-10-CM

## 2024-09-05 LAB
SL AMB  POCT GLUCOSE, UA: NORMAL
SL AMB POCT URINE PROTEIN: NORMAL

## 2024-09-05 PROCEDURE — PNV: Performed by: OBSTETRICS & GYNECOLOGY

## 2024-09-05 PROCEDURE — 81002 URINALYSIS NONAUTO W/O SCOPE: CPT | Performed by: OBSTETRICS & GYNECOLOGY

## 2024-09-05 NOTE — PROGRESS NOTES
"Routine Prenatal Visit  [facility] [place] - [address], [address], [place] [address]    Assessment/Plan:  Vinay is a 34 y.o. year old  at 35w0d who presents for routine prenatal visit.     1. 35 weeks gestation of pregnancy  -     POCT urine dip  2. Pregnancy resulting from in vitro fertilization in third trimester  3. OB Transfer of care at 17 weeks from [facility]      Next OB Visit 1 weeks.    Subjective:     CC: Prenatal care    Vinay Jason is a 34 y.o.  female who presents for routine prenatal care at 35w0d.  Pregnancy ROS: no leakage of fluid, pelvic pain, or vaginal bleeding.  normal fetal movement.    The following portions of the patient's history were reviewed and updated as appropriate: allergies, current medications, past family history, past medical history, obstetric history, gynecologic history, past social history, past surgical history and problem list.      Objective:  /66 (BP Location: Left arm, Patient Position: Sitting, Cuff Size: Standard)   Ht 5' 4\" (1.626 m)   Wt 78.2 kg (172 lb 6.4 oz)   LMP 2024   BMI 29.59 kg/m²   Pregravid Weight/BMI: 77.1 kg (170 lb) (BMI 29.17)  Current Weight: 78.2 kg (172 lb 6.4 oz)   Total Weight Gain: 1.089 kg (2 lb 6.4 oz)   Pre-Real Vitals      Flowsheet Row Most Recent Value   Prenatal Assessment    Prenatal Vitals    Blood Pressure 110/66   Weight - Scale 78.2 kg (172 lb 6.4 oz)   Urine Albumin/Glucose    Dilation/Effacement/Station    Vaginal Drainage    Edema              General: Well appearing, no distress  Abdomen: Soft, gravid, nontender  Extremities: Non tender.  "

## 2024-09-13 ENCOUNTER — ULTRASOUND (OUTPATIENT)
Dept: PERINATAL CARE | Facility: OTHER | Age: 34
End: 2024-09-13
Payer: COMMERCIAL

## 2024-09-13 VITALS
DIASTOLIC BLOOD PRESSURE: 68 MMHG | SYSTOLIC BLOOD PRESSURE: 112 MMHG | HEIGHT: 64 IN | WEIGHT: 174.5 LBS | HEART RATE: 110 BPM | BODY MASS INDEX: 29.79 KG/M2

## 2024-09-13 DIAGNOSIS — Z3A.36 36 WEEKS GESTATION OF PREGNANCY: ICD-10-CM

## 2024-09-13 DIAGNOSIS — O09.813 PREGNANCY RESULTING FROM IN VITRO FERTILIZATION IN THIRD TRIMESTER: Primary | ICD-10-CM

## 2024-09-13 PROCEDURE — 59025 FETAL NON-STRESS TEST: CPT | Performed by: OBSTETRICS & GYNECOLOGY

## 2024-09-13 PROCEDURE — 76816 OB US FOLLOW-UP PER FETUS: CPT | Performed by: OBSTETRICS & GYNECOLOGY

## 2024-09-13 PROCEDURE — 99212 OFFICE O/P EST SF 10 MIN: CPT | Performed by: OBSTETRICS & GYNECOLOGY

## 2024-09-13 NOTE — PROGRESS NOTES
Repeat Non-Stress Testing:    Patient verbalizes +FM. Pt denies ALL:               Leaking of fluid   Contractions   Vaginal bleeding   Decreased fetal movement    Patient is performing daily kick counts. Patient has no questions or concerns.   NST strip reviewed by Dr. Castillo.

## 2024-09-13 NOTE — LETTER
TATUM sleeve cover sheet    Patient name: Vinay Jason  : 1990  MRN: [id number]    RYLAND: Estimated Date of Delivery: 10/10/24    Obstetrician: Mally    Reason(s) for testing: IVF    Testing frequency:    ___  2x/wk  _x_  1x/wk  ___  Dopplers  ___  BPP?      Last growth scan: __________, _________, _________    Baby:      Male   /   Female   /   [place]             Baby Name: ___________________            IOL or  C/S: _____________________

## 2024-09-13 NOTE — PROGRESS NOTES
"[facility]: Vinay Jason was seen today for NST (found under the pregnancy episode) which I reviewed the RN assessment and agree, and fetal growth ultrasound (see ultrasound report under OB procedures tab).  See ultrasound report under \"OB Procedures\" tab.   Please don't hesitate to contact our office with any concerns or questions.  -Margy Castillo MD      "

## 2024-09-16 PROBLEM — Z3A.36 36 WEEKS GESTATION OF PREGNANCY: Status: ACTIVE | Noted: 2024-05-02

## 2024-09-18 ENCOUNTER — ULTRASOUND (OUTPATIENT)
Dept: PERINATAL CARE | Facility: OTHER | Age: 34
End: 2024-09-18
Payer: COMMERCIAL

## 2024-09-18 ENCOUNTER — ROUTINE PRENATAL (OUTPATIENT)
Dept: OBGYN CLINIC | Facility: CLINIC | Age: 34
End: 2024-09-18
Payer: COMMERCIAL

## 2024-09-18 VITALS
SYSTOLIC BLOOD PRESSURE: 110 MMHG | HEIGHT: 64 IN | HEART RATE: 96 BPM | BODY MASS INDEX: 29.81 KG/M2 | WEIGHT: 174.6 LBS | DIASTOLIC BLOOD PRESSURE: 60 MMHG

## 2024-09-18 VITALS
BODY MASS INDEX: 29.88 KG/M2 | SYSTOLIC BLOOD PRESSURE: 100 MMHG | WEIGHT: 175 LBS | DIASTOLIC BLOOD PRESSURE: 70 MMHG | HEIGHT: 64 IN

## 2024-09-18 DIAGNOSIS — Z3A.36 36 WEEKS GESTATION OF PREGNANCY: ICD-10-CM

## 2024-09-18 DIAGNOSIS — O09.813 PREGNANCY RESULTING FROM IN VITRO FERTILIZATION IN THIRD TRIMESTER: Primary | ICD-10-CM

## 2024-09-18 DIAGNOSIS — Z36.85 ANTENATAL SCREENING FOR STREPTOCOCCUS B: ICD-10-CM

## 2024-09-18 LAB
SL AMB  POCT GLUCOSE, UA: NORMAL
SL AMB POCT URINE PROTEIN: NORMAL

## 2024-09-18 PROCEDURE — 76815 OB US LIMITED FETUS(S): CPT | Performed by: OBSTETRICS & GYNECOLOGY

## 2024-09-18 PROCEDURE — 81002 URINALYSIS NONAUTO W/O SCOPE: CPT | Performed by: OBSTETRICS & GYNECOLOGY

## 2024-09-18 PROCEDURE — 59025 FETAL NON-STRESS TEST: CPT | Performed by: OBSTETRICS & GYNECOLOGY

## 2024-09-18 PROCEDURE — PNV: Performed by: OBSTETRICS & GYNECOLOGY

## 2024-09-18 NOTE — PROGRESS NOTES
"Routine Prenatal Visit  [facility] - [address], [address], [place] [address]    Assessment/Plan:  Vinay is a 34 y.o. year old  at 36w6d who presents for routine prenatal visit.     1. Pregnancy resulting from in vitro fertilization in third trimester  Assessment & Plan:  Weekly  testing with MFM.  2.  screening for streptococcus B  -     Strep Gp B JULIETTE+Rflx  3. 36 weeks gestation of pregnancy  -     POCT urine dip      Next OB Visit 1 weeks.    Subjective:     CC: Prenatal care    Vinay Jason is a 34 y.o.  female who presents for routine prenatal care at 36w6d.  Pregnancy ROS: no leakage of fluid, pelvic pain, or vaginal bleeding.  normal fetal movement.    The following portions of the patient's history were reviewed and updated as appropriate: allergies, current medications, past family history, past medical history, obstetric history, gynecologic history, past social history, past surgical history and problem list.      Objective:  /70 (BP Location: Left arm, Patient Position: Sitting, Cuff Size: Standard)   Ht 5' 4\" (1.626 m)   Wt 79.4 kg (175 lb)   LMP 2024   BMI 30.04 kg/m²   Pregravid Weight/BMI: 77.1 kg (170 lb) (BMI 29.17)  Current Weight: 79.4 kg (175 lb)   Total Weight Gain: 2.268 kg (5 lb)   Pre- Vitals      Flowsheet Row Most Recent Value   Prenatal Assessment    Fetal Heart Rate 155   Fundal Height (cm) 36 cm   Movement Present   Presentation Vertex   Prenatal Vitals    Blood Pressure 100/70   Weight - Scale 79.4 kg (175 lb)   Urine Albumin/Glucose    Dilation/Effacement/Station    Cervical Dilation 2   Cervical Effacement 50   Fetal Station -3   Vaginal Drainage    Edema    LLE Edema None   RLE Edema None             General: Well appearing, no distress  Abdomen: Soft, gravid, nontender  Extremities: Non tender.  "

## 2024-09-18 NOTE — PROGRESS NOTES
Repeat Non-Stress Testing:    Patient verbalizes +FM. Pt denies ALL:               Leaking of fluid   Contractions   Vaginal bleeding   Decreased fetal movement    Patient is performing daily kick counts. Patient has no questions or concerns.   NST strip reviewed by Dr. Acosta.

## 2024-09-20 LAB — GP B STREP DNA SPEC QL NAA+PROBE: NEGATIVE

## 2024-09-22 ENCOUNTER — HOSPITAL ENCOUNTER (OUTPATIENT)
Facility: HOSPITAL | Age: 34
Discharge: HOME | End: 2024-09-22
Attending: OBSTETRICS & GYNECOLOGY | Admitting: OBSTETRICS & GYNECOLOGY
Payer: COMMERCIAL

## 2024-09-22 ENCOUNTER — NURSE TRIAGE (OUTPATIENT)
Dept: OTHER | Facility: OTHER | Age: 34
End: 2024-09-22

## 2024-09-22 VITALS
HEART RATE: 91 BPM | BODY MASS INDEX: 30.04 KG/M2 | DIASTOLIC BLOOD PRESSURE: 85 MMHG | SYSTOLIC BLOOD PRESSURE: 119 MMHG | HEIGHT: 64 IN | RESPIRATION RATE: 20 BRPM | OXYGEN SATURATION: 96 %

## 2024-09-22 PROBLEM — O36.8190 DECREASED FETAL MOVEMENT AFFECTING MANAGEMENT OF MOTHER, ANTEPARTUM: Status: ACTIVE | Noted: 2024-08-23

## 2024-09-22 PROBLEM — O26.893 VAGINAL DISCHARGE DURING PREGNANCY, ANTEPARTUM, THIRD TRIMESTER: Status: ACTIVE | Noted: 2024-09-22

## 2024-09-22 PROBLEM — N89.8 VAGINAL DISCHARGE DURING PREGNANCY, ANTEPARTUM, THIRD TRIMESTER: Status: ACTIVE | Noted: 2024-09-22

## 2024-09-22 PROCEDURE — 59025 FETAL NON-STRESS TEST: CPT | Performed by: OBSTETRICS & GYNECOLOGY

## 2024-09-22 PROCEDURE — 99203 OFFICE O/P NEW LOW 30 MIN: CPT

## 2024-09-22 PROCEDURE — 76815 OB US LIMITED FETUS(S): CPT | Performed by: OBSTETRICS & GYNECOLOGY

## 2024-09-22 NOTE — PROCEDURES
Vinay Jason, a  at 37w3d with an RYLAND of 10/10/2024, Date entered prior to episode creation, was seen at [facility] for the following procedure(s): $Procedure Type: NST, JALEN]    Nonstress Test  Reason for NST: Decreased Fetal Movement  Variability: Moderate  Decelerations: None  Accelerations: Yes  Acoustic Stimulator: No  Baseline: 150 BPM  Uterine Irritability: No  Contractions: Not present    4 Quadrant JALEN  JALEN Q1 (cm): 3 cm  JALEN Q2 (cm): 1.2 cm  JALEN Q3 (cm): 4 cm  JALEN Q4 (cm): 1.5 cm  JALEN TOTAL (cm): 9.7 cm  LVP (cm):  (cephalic)              Interpretation  Nonstress Test Interpretation: Reactive  Overall Impression: Reassuring

## 2024-09-22 NOTE — ASSESSMENT & PLAN NOTE
- Reactive NST   - Active fetus in cephalic position with JALEN 9.7 noted on US  - ok for discharge

## 2024-09-22 NOTE — H&P
"Triage Note - OB  Vinay Jason 34 y.o. female MRN: [id number]  Unit/Bed#: LD TRIAGE 2- Encounter: [id number]        ASSESSMENT/PLAN  Vinay Jason is a 34 y.o.  at 37w3d presents with decreased fetal movement and possible leaking of fluid this morning.    Assessment & Plan  Decreased fetal movement affecting management of mother, antepartum  - Reactive NST   - Active fetus in cephalic position with JALEN 9.7 noted on US  - ok for discharge  Vaginal discharge during pregnancy, antepartum, third trimester  SSE negative for pooling/nitrazine/ferning  SVE   No contractions on TOCO  To f/u in office this coming week  Pregnancy resulting from in vitro fertilization in third trimester    Intermittent palpitations           Discharge instructions  - Patient instructed to call if experiencing worsening contractions, vaginal bleeding, loss of fluid or decreased fetal movement.  - Will follow up with OBGYN in office      She is a patient of [facility]  D/w Dr. Treviño, on call OBGYN Attending Physician  ______________    SUBJECTIVE    RYLAND: Estimated Date of Delivery: 10/10/24    HPI:  34 y.o.  37w3d presents with complaint of decreased fetal movement and possible leaking of fluid this morning.       Her obstetrical history is significant for     ROS:  Constitutional: Negative  Respiratory: Negative  Cardiovascular: Negative    Gastrointestinal: Negative     Physical Exam  General: Well appearing, no distress  Respiratory: normal respiratory effort  Abdomen: Soft, gravid, nontender    Extremities: Warm and well perfused.  Non tender.      OBJECTIVE:  /85 (BP Location: Right arm)   Pulse 91   Resp 20   Ht 5' 4\" (1.626 m)   LMP 2024   SpO2 96%   BMI 30.04 kg/m²   Body mass index is 30.04 kg/m².  Labs: No results found for this or any previous visit (from the past 24 hour(s)).      SSE: negative pooling/negative nitrazine, negative ferning  SVE: /-  Cervical " "Dilation: 2  Cervical Effacement: 70  Cervical Consistency: Medium  Fetal Station: -2  Presentation: Vertex  Method: Manual (SSE: negative pooling/negative nitrazine, negative ferning)  OB Examiner: MD Oneal    FHT: Reactive, see note       TOCO: none       IMAGING:Cephalic, JALEN 9.6 cm      Dai MD Oneal  OB/GYN   9/22/2024  2:31 PM      Portions of the record may have been created with voice recognition software.  Occasional wrong word or \"sound a like\" substitutions may have occurred due to the inherent limitations of voice recognition software.  Read the chart carefully and recognize, using context, where substitutions have occurred      "

## 2024-09-22 NOTE — TELEPHONE ENCOUNTER
"Reason for Disposition  • Leakage of fluid from vagina    Answer Assessment - Initial Assessment Questions  1. ONSET: \"When did the symptoms begin?\"         Started this AM- 0800      Went to the bathroom then went back to her room and then had a big gush of clear fluid- has had a few small trickles of fluid throughout the day    2. CONTRACTIONS: \"Are you having any contractions?\" If yes, ask: \"Describe the contractions that you are having.\" (e.g., duration, frequency, regularity, severity)      Denies     3. RYLAND: \"What date are you expecting to deliver?\"      10/10/24    4. PARITY: \"Have you had a baby before?\" If Yes, ask: \"How long did the labor last?\"          5. FETAL MOVEMENT: \"Has the baby's movement decreased or changed significantly from normal?\"      Has had 1-2 movements this AM      Had about 1 movement over two hours     6. OTHER SYMPTOMS: \"Do you have any other symptoms?\" (e.g., abdominal pain, vaginal bleeding, fever, hand/facial swelling)      Denies vaginal bleeding    Protocols used: Pregnancy - Rupture of Membranes-ADULT-AH    "

## 2024-09-22 NOTE — TELEPHONE ENCOUNTER
Patient stated she is currently on her way to the [facility] at this time to be evaluated on Labor and Delivery. Stated she has concerns her water may have broken this AM and since then has been having decreased fetal movement. Instructed patient to continue to the [facility] at this time for evaluation on Labor and Delivery. On call provider and L&D charge RN made aware of patient's pending arrival.   No Yes

## 2024-09-22 NOTE — ASSESSMENT & PLAN NOTE
SSE negative for pooling/nitrazine/ferning  SVE 2/70/-2  No contractions on TOCO  To f/u in office this coming week

## 2024-09-24 ENCOUNTER — ROUTINE PRENATAL (OUTPATIENT)
Dept: OBGYN CLINIC | Facility: CLINIC | Age: 34
End: 2024-09-24
Payer: COMMERCIAL

## 2024-09-24 VITALS
BODY MASS INDEX: 30.19 KG/M2 | WEIGHT: 176.8 LBS | HEIGHT: 64 IN | DIASTOLIC BLOOD PRESSURE: 66 MMHG | SYSTOLIC BLOOD PRESSURE: 108 MMHG

## 2024-09-24 DIAGNOSIS — O09.813 PREGNANCY RESULTING FROM IN VITRO FERTILIZATION IN THIRD TRIMESTER: Primary | ICD-10-CM

## 2024-09-24 DIAGNOSIS — Z3A.37 37 WEEKS GESTATION OF PREGNANCY: ICD-10-CM

## 2024-09-24 DIAGNOSIS — Z34.90 PREGNANCY WITH PRENATAL CARE ELSEWHERE, ANTEPARTUM: ICD-10-CM

## 2024-09-24 DIAGNOSIS — R00.2 INTERMITTENT PALPITATIONS: ICD-10-CM

## 2024-09-24 LAB
SL AMB  POCT GLUCOSE, UA: NORMAL
SL AMB POCT URINE PROTEIN: NORMAL

## 2024-09-24 PROCEDURE — PNV: Performed by: STUDENT IN AN ORGANIZED HEALTH CARE EDUCATION/TRAINING PROGRAM

## 2024-09-24 PROCEDURE — 81002 URINALYSIS NONAUTO W/O SCOPE: CPT | Performed by: STUDENT IN AN ORGANIZED HEALTH CARE EDUCATION/TRAINING PROGRAM

## 2024-09-24 NOTE — ASSESSMENT & PLAN NOTE
- Labor/Bleeding/ROM precautions given. Discussed fetal movement at length. Kick counts reviewed. She will do kick counts when she gets home. If not feeling 10 movements in 2 hours, she was instructed to call office and proceed to L&D for re-evaluation. We discussed that decreased fetal movement at term should be taken seriously-- I therefore recommend that she maintain a low threshold to be re-evaluated in triage.   - GBS negative result reviewed.   -  Problem list updated, results console reviewed and updated with pertinent prenatal labs.  - PMH, PSH, medications reviewed and updated as needed  - Return to office in 1 wk(s) for routine prenatal care

## 2024-09-24 NOTE — PROGRESS NOTES
"Routine Prenatal Visit  [facility] - [place]  [address] [place] [address]    Assessment/Plan:  Vinay is a 34 y.o. year old  at 37w5d who presents for routine prenatal visit.     1. Pregnancy resulting from in vitro fertilization in third trimester  Assessment & Plan:  - Continue weekly APFS until delivery.  2. OB Transfer of care at 17 weeks from [facility]  3. 37 weeks gestation of pregnancy  Assessment & Plan:  - Labor/Bleeding/ROM precautions given. Discussed fetal movement at length. Kick counts reviewed. She will do kick counts when she gets home. If not feeling 10 movements in 2 hours, she was instructed to call office and proceed to L&D for re-evaluation.   - GBS negative result reviewed.   -  Problem list updated, results console reviewed and updated with pertinent prenatal labs.  - PMH, PSH, medications reviewed and updated as needed  - Return to office in 1 wk(s) for routine prenatal care  Orders:  -     POCT urine dip  4. Intermittent palpitations        Subjective:   Vinay Jason is a 34 y.o.  who presents for routine prenatal care at 37w5d.  Complaints today: Questions about fetal movement-- was seen in triage over the weekend for decreased FM. Evaluation was reassuring and she was discharged home. She reports that she is still unsure whether she is perceiving adequate movement. She reports present movement, but subjectively decreased. She has not done kick counts since leaving L&D this weekend.   LOF: No; VB: No; Contractions: No; FM: See above.     Objective:  /66 (BP Location: Left arm, Patient Position: Sitting, Cuff Size: Standard)   Ht 5' 4\" (1.626 m)   Wt 80.2 kg (176 lb 12.8 oz)   LMP 2024   BMI 30.35 kg/m²     General: Well appearing, no distress  Respiratory: Unlabored breathing  Cardiovascular: Regular rate.  Abdomen: Soft, gravid, nontender  Extremities: Warm and well perfused.  Non tender.    Pregravid Weight/BMI: 77.1 kg (170 lb) (BMI " 29.17)  Current Weight: 80.2 kg (176 lb 12.8 oz)   Total Weight Gain: 3.084 kg (6 lb 12.8 oz)     Pre-Real Vitals      Flowsheet Row Most Recent Value   Prenatal Assessment    Fetal Heart Rate 155   Fundal Height (cm) 37 cm   Movement Present   Presentation Vertex   Prenatal Vitals    Blood Pressure 108/66   Weight - Scale 80.2 kg (176 lb 12.8 oz)   Urine Albumin/Glucose    Dilation/Effacement/Station    Vaginal Drainage    Draining Fluid No   Edema    LLE Edema None   RLE Edema None   Facial Edema None             Santosh Masters MD  2024 9:13 AM

## 2024-09-27 ENCOUNTER — ULTRASOUND (OUTPATIENT)
Dept: PERINATAL CARE | Facility: OTHER | Age: 34
End: 2024-09-27
Payer: COMMERCIAL

## 2024-09-27 VITALS
SYSTOLIC BLOOD PRESSURE: 102 MMHG | HEART RATE: 110 BPM | HEIGHT: 64 IN | WEIGHT: 175.8 LBS | BODY MASS INDEX: 30.01 KG/M2 | DIASTOLIC BLOOD PRESSURE: 64 MMHG

## 2024-09-27 DIAGNOSIS — Z3A.38 38 WEEKS GESTATION OF PREGNANCY: ICD-10-CM

## 2024-09-27 DIAGNOSIS — O09.813 PREGNANCY RESULTING FROM IN VITRO FERTILIZATION IN THIRD TRIMESTER: Primary | ICD-10-CM

## 2024-09-27 PROCEDURE — 76818 FETAL BIOPHYS PROFILE W/NST: CPT | Performed by: OBSTETRICS & GYNECOLOGY

## 2024-09-27 NOTE — PROGRESS NOTES
Repeat Non-Stress Testing:    Patient verbalizes +FM. Pt denies ALL:               Leaking of fluid   Contractions  She states that she has noticed a decrease in fetal movement recently. She feels that at times she has to lay down and focus on movement in order to get the movements.    Patient is performing daily kick counts. Patient has no questions or concerns.   NST strip reviewed by Dr. Acosta.

## 2024-10-01 ENCOUNTER — ROUTINE PRENATAL (OUTPATIENT)
Dept: OBGYN CLINIC | Facility: CLINIC | Age: 34
End: 2024-10-01
Payer: COMMERCIAL

## 2024-10-01 VITALS
BODY MASS INDEX: 30.22 KG/M2 | SYSTOLIC BLOOD PRESSURE: 110 MMHG | DIASTOLIC BLOOD PRESSURE: 68 MMHG | WEIGHT: 177 LBS | HEIGHT: 64 IN

## 2024-10-01 DIAGNOSIS — Z3A.38 38 WEEKS GESTATION OF PREGNANCY: ICD-10-CM

## 2024-10-01 DIAGNOSIS — Z34.90 PREGNANCY WITH PRENATAL CARE ELSEWHERE, ANTEPARTUM: ICD-10-CM

## 2024-10-01 DIAGNOSIS — O09.813 PREGNANCY RESULTING FROM IN VITRO FERTILIZATION IN THIRD TRIMESTER: Primary | ICD-10-CM

## 2024-10-01 LAB
SL AMB  POCT GLUCOSE, UA: NORMAL
SL AMB POCT URINE PROTEIN: NORMAL

## 2024-10-01 PROCEDURE — 81002 URINALYSIS NONAUTO W/O SCOPE: CPT | Performed by: OBSTETRICS & GYNECOLOGY

## 2024-10-01 PROCEDURE — PNV: Performed by: OBSTETRICS & GYNECOLOGY

## 2024-10-01 NOTE — ASSESSMENT & PLAN NOTE
Reviewed with patient that any pregnant women can undergo an elective induction of labor at 39 weeks or later, depending on hospital availability.  One study showed that induction of labor of first time mothers at 39 weeks compared to waiting until 41 weeks, decreased  rate by 3%, and decreased risk of developing high blood pressure in pregnancy.  Alternatively induction of labor can often result in longer in-hospital stays overall, than allowing for spontaneous labor.  This is especially true in patients who have an unfavorable cervix and require the additional step of cervical ripening prior to induction of labor - leading often to an additional 12-24 hours in the hospital prior to delivery, during which there is continuous fetal monitoring.  For all patients who have not gone into labor spontaneously, we recommend induction of labor between 40.6-41.6 weeks, due to increase risk of stillbirth.  Pregnancies past 40.6 weeks who are not in the process of induction, we recommend  testing with NST and JALEN 2x/week.    Discussed these pros and cons in relation to patient's desires for her delivery process, as well as the fact that she is a healthy patient with no other risk factors.  She wishes to proceed with IOL. First available with L&D is 10/10/24 at 730.

## 2024-10-01 NOTE — PROGRESS NOTES
Routine Prenatal Visit  [facility] - [place]  [address], [place] [address]    Assessment/Plan:  Vinay is a 34 y.o. year old  at 38w5d who presents for routine prenatal visit.     1. Pregnancy resulting from in vitro fertilization in third trimester  2. OB Transfer of care at 17 weeks from [facility]  3. 38 weeks gestation of pregnancy  Assessment & Plan:  Reviewed with patient that any pregnant women can undergo an elective induction of labor at 39 weeks or later, depending on hospital availability.  One study showed that induction of labor of first time mothers at 39 weeks compared to waiting until 41 weeks, decreased  rate by 3%, and decreased risk of developing high blood pressure in pregnancy.  Alternatively induction of labor can often result in longer in-hospital stays overall, than allowing for spontaneous labor.  This is especially true in patients who have an unfavorable cervix and require the additional step of cervical ripening prior to induction of labor - leading often to an additional 12-24 hours in the hospital prior to delivery, during which there is continuous fetal monitoring.  For all patients who have not gone into labor spontaneously, we recommend induction of labor between 40.6-41.6 weeks, due to increase risk of stillbirth.  Pregnancies past 40.6 weeks who are not in the process of induction, we recommend  testing with NST and JALEN 2x/week.    Discussed these pros and cons in relation to patient's desires for her delivery process, as well as the fact that she is a healthy patient with no other risk factors.  She wishes to proceed with IOL. First available with L&D is 10/10/24 at 730.    Orders:  -     POCT urine dip        Subjective:   Vinay Jason is a 34 y.o.  who presents for routine prenatal care at 38w5d.  Complaints today: Denies  LOF: -; VB: -; Contractions: -; FM: +    Objective:  /68 (BP Location: Left arm, Patient  "Position: Sitting, Cuff Size: Standard)   Ht 5' 4\" (1.626 m)   Wt 80.3 kg (177 lb)   LMP 2024   BMI 30.38 kg/m²     General: Well appearing, no distress  Respiratory: Unlabored breathing  Abdomen: Soft, gravid, nontender  Extremities: Warm and well perfused.  Non tender.    Pregravid Weight/BMI: 77.1 kg (170 lb) (BMI 29.17)  Current Weight: 80.3 kg (177 lb)   Total Weight Gain: 3.175 kg (7 lb)     Pre-Real Vitals      Flowsheet Row Most Recent Value   Prenatal Assessment    Fetal Heart Rate 150   Fundal Height (cm) 38 cm   Movement Present   Presentation Vertex   Prenatal Vitals    Blood Pressure 110/68   Weight - Scale 80.3 kg (177 lb)   Urine Albumin/Glucose    Dilation/Effacement/Station    Cervical Dilation 2.5   Cervical Effacement 60   Fetal Station -2   Vaginal Drainage    Edema              Cookie Kulkarni DO  10/1/2024 9:16 AM     "

## 2024-10-02 ENCOUNTER — ULTRASOUND (OUTPATIENT)
Dept: PERINATAL CARE | Facility: OTHER | Age: 34
End: 2024-10-02
Payer: COMMERCIAL

## 2024-10-02 VITALS
HEART RATE: 98 BPM | WEIGHT: 176.4 LBS | SYSTOLIC BLOOD PRESSURE: 104 MMHG | BODY MASS INDEX: 30.11 KG/M2 | DIASTOLIC BLOOD PRESSURE: 58 MMHG | HEIGHT: 64 IN

## 2024-10-02 DIAGNOSIS — Z3A.38 38 WEEKS GESTATION OF PREGNANCY: ICD-10-CM

## 2024-10-02 DIAGNOSIS — O09.813 PREGNANCY RESULTING FROM IN VITRO FERTILIZATION IN THIRD TRIMESTER: Primary | ICD-10-CM

## 2024-10-02 PROCEDURE — 59025 FETAL NON-STRESS TEST: CPT | Performed by: OBSTETRICS & GYNECOLOGY

## 2024-10-02 PROCEDURE — 76815 OB US LIMITED FETUS(S): CPT | Performed by: OBSTETRICS & GYNECOLOGY

## 2024-10-02 NOTE — LETTER
October 2, 2024     Cookie Kulkarni V,   [address]  [address]  [place] [address]    Patient: Vinay Jason   YOB: 1990   Date of Visit: 10/2/2024       Dear Dr. Kulkarni:    Thank you for referring Vinay Jason to me for evaluation. Below are my notes for this consultation.    If you have questions, please do not hesitate to call me. I look forward to following your patient along with you.         Sincerely,        Gio Bass MD        CC: No Recipients    Gio Bass MD  10/2/2024  9:33 AM  Sign when Signing Visit  Please refer to the [facility] ultrasound report in Ob Procedures for additional information regarding today's visit

## 2024-10-02 NOTE — PROGRESS NOTES
"Repeat Non-Stress Testing:    Patient verbalizes +FM. Pt denies ALL:               Leaking of fluid   Contractions   Vaginal bleeding   Decreased fetal movement    Patient is performing daily kick counts. Patient has no questions or concerns. She states that she had contractions last night that were \"every couple of minutes.\" However, she states that so far today, she is not having any.    NST strip reviewed by Dr. Bass.    "

## 2024-10-04 ENCOUNTER — HOSPITAL ENCOUNTER (INPATIENT)
Facility: HOSPITAL | Age: 34
LOS: 2 days | Discharge: HOME | End: 2024-10-06
Attending: OBSTETRICS & GYNECOLOGY | Admitting: OBSTETRICS & GYNECOLOGY
Payer: COMMERCIAL

## 2024-10-04 ENCOUNTER — NURSE TRIAGE (OUTPATIENT)
Age: 34
End: 2024-10-04

## 2024-10-04 DIAGNOSIS — O09.813 PREGNANCY RESULTING FROM IN VITRO FERTILIZATION IN THIRD TRIMESTER: Primary | ICD-10-CM

## 2024-10-04 PROBLEM — O36.8130 DECREASED FETAL MOVEMENT AFFECTING MANAGEMENT OF PREGNANCY IN THIRD TRIMESTER: Status: ACTIVE | Noted: 2024-07-16

## 2024-10-04 LAB
ABO GROUP BLD: NORMAL
ALBUMIN SERPL BCG-MCNC: 3.7 G/DL (ref 3.5–5)
ALP SERPL-CCNC: 189 U/L (ref 34–104)
ALT SERPL W P-5'-P-CCNC: 8 U/L (ref 7–52)
ANION GAP SERPL CALCULATED.3IONS-SCNC: 12 MMOL/L (ref 4–13)
AST SERPL W P-5'-P-CCNC: 15 U/L (ref 13–39)
BASOPHILS # BLD AUTO: 0.04 THOUSANDS/ΜL (ref 0–0.1)
BASOPHILS NFR BLD AUTO: 0 % (ref 0–1)
BILIRUB SERPL-MCNC: 0.42 MG/DL (ref 0.2–1)
BILIRUB UR QL STRIP: NEGATIVE
BLD GP AB SCN SERPL QL: NEGATIVE
BUN SERPL-MCNC: 8 MG/DL (ref 5–25)
CALCIUM SERPL-MCNC: 8.9 MG/DL (ref 8.4–10.2)
CHLORIDE SERPL-SCNC: 106 MMOL/L (ref 96–108)
CLARITY UR: CLEAR
CO2 SERPL-SCNC: 19 MMOL/L (ref 21–32)
COLOR UR: YELLOW
CREAT SERPL-MCNC: 0.53 MG/DL (ref 0.6–1.3)
EOSINOPHIL # BLD AUTO: 0.05 THOUSAND/ΜL (ref 0–0.61)
EOSINOPHIL NFR BLD AUTO: 1 % (ref 0–6)
ERYTHROCYTE [DISTWIDTH] IN BLOOD BY AUTOMATED COUNT: 13.9 % (ref 11.6–15.1)
GFR SERPL CREATININE-BSD FRML MDRD: 125 ML/MIN/1.73SQ M
GLUCOSE SERPL-MCNC: 72 MG/DL (ref 65–140)
GLUCOSE UR STRIP-MCNC: NEGATIVE MG/DL
HCT VFR BLD AUTO: 33.5 % (ref 34.8–46.1)
HGB BLD-MCNC: 10.5 G/DL (ref 11.5–15.4)
HGB UR QL STRIP.AUTO: NEGATIVE
HOLD SPECIMEN: NORMAL
IMM GRANULOCYTES # BLD AUTO: 0.14 THOUSAND/UL (ref 0–0.2)
IMM GRANULOCYTES NFR BLD AUTO: 1 % (ref 0–2)
KETONES UR STRIP-MCNC: ABNORMAL MG/DL
LEUKOCYTE ESTERASE UR QL STRIP: NEGATIVE
LYMPHOCYTES # BLD AUTO: 1.93 THOUSANDS/ΜL (ref 0.6–4.47)
LYMPHOCYTES NFR BLD AUTO: 20 % (ref 14–44)
MCH RBC QN AUTO: 25.4 PG (ref 26.8–34.3)
MCHC RBC AUTO-ENTMCNC: 31.3 G/DL (ref 31.4–37.4)
MCV RBC AUTO: 81 FL (ref 82–98)
MONOCYTES # BLD AUTO: 0.63 THOUSAND/ΜL (ref 0.17–1.22)
MONOCYTES NFR BLD AUTO: 6 % (ref 4–12)
NEUTROPHILS # BLD AUTO: 7.11 THOUSANDS/ΜL (ref 1.85–7.62)
NEUTS SEG NFR BLD AUTO: 72 % (ref 43–75)
NITRITE UR QL STRIP: NEGATIVE
NRBC BLD AUTO-RTO: 0 /100 WBCS
PH UR STRIP.AUTO: 5.5 [PH]
PLATELET # BLD AUTO: 175 THOUSANDS/UL (ref 149–390)
PMV BLD AUTO: 11 FL (ref 8.9–12.7)
POTASSIUM SERPL-SCNC: 3.6 MMOL/L (ref 3.5–5.3)
PROT SERPL-MCNC: 6.4 G/DL (ref 6.4–8.4)
PROT UR STRIP-MCNC: NEGATIVE MG/DL
RBC # BLD AUTO: 4.14 MILLION/UL (ref 3.81–5.12)
RH BLD: POSITIVE
RUBV IGG SERPL IA-ACNC: 68.7 IU/ML
SODIUM SERPL-SCNC: 137 MMOL/L (ref 135–147)
SP GR UR STRIP.AUTO: 1.02 (ref 1–1.03)
SPECIMEN EXPIRATION DATE: NORMAL
T4 FREE SERPL-MCNC: 0.75 NG/DL (ref 0.61–1.12)
TSH SERPL DL<=0.05 MIU/L-ACNC: 1.5 UIU/ML (ref 0.45–4.5)
UROBILINOGEN UR STRIP-ACNC: <2 MG/DL
WBC # BLD AUTO: 9.9 THOUSAND/UL (ref 4.31–10.16)

## 2024-10-04 PROCEDURE — 86762 RUBELLA ANTIBODY: CPT | Performed by: OBSTETRICS & GYNECOLOGY

## 2024-10-04 PROCEDURE — 59025 FETAL NON-STRESS TEST: CPT | Performed by: OBSTETRICS & GYNECOLOGY

## 2024-10-04 PROCEDURE — 80053 COMPREHEN METABOLIC PANEL: CPT | Performed by: OBSTETRICS & GYNECOLOGY

## 2024-10-04 PROCEDURE — 86850 RBC ANTIBODY SCREEN: CPT | Performed by: OBSTETRICS & GYNECOLOGY

## 2024-10-04 PROCEDURE — 4A1HXCZ MONITORING OF PRODUCTS OF CONCEPTION, CARDIAC RATE, EXTERNAL APPROACH: ICD-10-PCS | Performed by: OBSTETRICS & GYNECOLOGY

## 2024-10-04 PROCEDURE — 84443 ASSAY THYROID STIM HORMONE: CPT | Performed by: OBSTETRICS & GYNECOLOGY

## 2024-10-04 PROCEDURE — 86900 BLOOD TYPING SEROLOGIC ABO: CPT | Performed by: OBSTETRICS & GYNECOLOGY

## 2024-10-04 PROCEDURE — 86901 BLOOD TYPING SEROLOGIC RH(D): CPT | Performed by: OBSTETRICS & GYNECOLOGY

## 2024-10-04 PROCEDURE — 85025 COMPLETE CBC W/AUTO DIFF WBC: CPT | Performed by: OBSTETRICS & GYNECOLOGY

## 2024-10-04 PROCEDURE — 10907ZC DRAINAGE OF AMNIOTIC FLUID, THERAPEUTIC FROM PRODUCTS OF CONCEPTION, VIA NATURAL OR ARTIFICIAL OPENING: ICD-10-PCS | Performed by: OBSTETRICS & GYNECOLOGY

## 2024-10-04 PROCEDURE — 99212 OFFICE O/P EST SF 10 MIN: CPT

## 2024-10-04 PROCEDURE — 84439 ASSAY OF FREE THYROXINE: CPT | Performed by: OBSTETRICS & GYNECOLOGY

## 2024-10-04 PROCEDURE — 3E033VJ INTRODUCTION OF OTHER HORMONE INTO PERIPHERAL VEIN, PERCUTANEOUS APPROACH: ICD-10-PCS | Performed by: OBSTETRICS & GYNECOLOGY

## 2024-10-04 PROCEDURE — 81003 URINALYSIS AUTO W/O SCOPE: CPT | Performed by: OBSTETRICS & GYNECOLOGY

## 2024-10-04 PROCEDURE — 93005 ELECTROCARDIOGRAM TRACING: CPT

## 2024-10-04 PROCEDURE — 87086 URINE CULTURE/COLONY COUNT: CPT | Performed by: OBSTETRICS & GYNECOLOGY

## 2024-10-04 RX ORDER — OXYTOCIN/RINGER'S LACTATE 30/500 ML
1-30 PLASTIC BAG, INJECTION (ML) INTRAVENOUS
Status: DISCONTINUED | OUTPATIENT
Start: 2024-10-04 | End: 2024-10-05

## 2024-10-04 RX ORDER — BUPIVACAINE HYDROCHLORIDE 2.5 MG/ML
30 INJECTION, SOLUTION EPIDURAL; INFILTRATION; INTRACAUDAL ONCE AS NEEDED
Status: DISCONTINUED | OUTPATIENT
Start: 2024-10-04 | End: 2024-10-05

## 2024-10-04 RX ORDER — DIPHENHYDRAMINE HYDROCHLORIDE 50 MG/ML
25 INJECTION INTRAMUSCULAR; INTRAVENOUS EVERY 6 HOURS PRN
Status: DISCONTINUED | OUTPATIENT
Start: 2024-10-04 | End: 2024-10-05

## 2024-10-04 RX ORDER — FAMOTIDINE 10 MG/ML
20 INJECTION, SOLUTION INTRAVENOUS EVERY 12 HOURS SCHEDULED
Status: DISCONTINUED | OUTPATIENT
Start: 2024-10-04 | End: 2024-10-05

## 2024-10-04 RX ORDER — BUTORPHANOL TARTRATE 1 MG/ML
1 INJECTION, SOLUTION INTRAMUSCULAR; INTRAVENOUS
Status: DISCONTINUED | OUTPATIENT
Start: 2024-10-04 | End: 2024-10-05

## 2024-10-04 RX ORDER — SODIUM CHLORIDE, SODIUM LACTATE, POTASSIUM CHLORIDE, CALCIUM CHLORIDE 600; 310; 30; 20 MG/100ML; MG/100ML; MG/100ML; MG/100ML
125 INJECTION, SOLUTION INTRAVENOUS CONTINUOUS
Status: DISCONTINUED | OUTPATIENT
Start: 2024-10-04 | End: 2024-10-05

## 2024-10-04 RX ORDER — ONDANSETRON 2 MG/ML
4 INJECTION INTRAMUSCULAR; INTRAVENOUS EVERY 6 HOURS PRN
Status: DISCONTINUED | OUTPATIENT
Start: 2024-10-04 | End: 2024-10-05

## 2024-10-04 RX ORDER — CALCIUM CARBONATE 500 MG/1
500 TABLET, CHEWABLE ORAL 3 TIMES DAILY PRN
Status: DISCONTINUED | OUTPATIENT
Start: 2024-10-04 | End: 2024-10-05

## 2024-10-04 RX ADMIN — Medication 2 MILLI-UNITS/MIN: at 19:45

## 2024-10-04 RX ADMIN — SODIUM CHLORIDE, SODIUM LACTATE, POTASSIUM CHLORIDE, AND CALCIUM CHLORIDE 125 ML/HR: .6; .31; .03; .02 INJECTION, SOLUTION INTRAVENOUS at 18:47

## 2024-10-04 RX ADMIN — FAMOTIDINE 20 MG: 10 INJECTION INTRAVENOUS at 19:49

## 2024-10-04 NOTE — H&P
Ob/Gyn History and Physical  Vinay Jason 34 y.o. female MRN: [id number]  Unit/Bed#: -01 Encounter: [id number]    A/P. 34 y.o.  at 39w1d, admitted for IOL for persistent decreased fetal movement.       Assessment & Plan  Encounter for induction of labor  - Admit, IVF and labs  - SVE 3/70/-3  - bedside US confirms cephalic presentation  - EFM Category 1  - TOCO: no contractions but irritability presents  - GBS negative  - desires epidural when active  - expecting girl  Intermittent palpitations  - denies symptoms at this time  - No murmur auscultated on exam today and HR 93  - initially was to be referred to cardiology, but has since increased her salt and electrolytes intake with saltier foods and gatorade, and has felt much better  - will get an ECG on this admission to serve as baseline  - will check CMP along with labs  - will check TSH and free T4 with labs  Decreased fetal movement affecting management of pregnancy in third trimester    Pregnancy resulting from in vitro fertilization in third trimester      D/w Dr Hamlet No MD  10/04/24  -------------------------------------------------------------------------------------------------------  CC: decreased fetal movement      HPI: 35 yo  at 39+1 presents for persistent decreased fetal movement in the third trimester, now for approximately 2-3 weeks. NST in triage is now reactive. Given full term status in setting of persistent decreased fetal movement, patient was offered IOL and accepted.     Pregnancy has been complicated by intermittent palpitations which have not been evaluated by a cardiologist yet given palpitations have decreased since she increased her salt and electrolyte intake. She states that earlier in her third trimester, resting heart rate was towards 130, now it has been most often in the 90s.    Expecting girl. Desires epidural. GBS negative.    Pregnancy complications:      Patient Active Problem List    Diagnosis    38 weeks gestation of pregnancy    Pregnancy resulting from in vitro fertilization in third trimester    OB Transfer of care at 17 weeks from [facility]    Intermittent palpitations    Decreased fetal movement affecting management of pregnancy in third trimester       Allergies:      Allergies as of 2024 - Reviewed 2024   Allergen Reaction Noted    Penicillins Hives and Shortness Of Breath 2005       Rx:      No current facility-administered medications on file prior to encounter.     Current Outpatient Medications on File Prior to Encounter   Medication Sig Dispense Refill    Doxylamine Succinate, Sleep, (UNISOM PO) Take by mouth      Prenatal Vit-Fe Fumarate-FA (PRENATAL 19 PO) Take by mouth         PMH:      Past Medical History:   Diagnosis Date    Abnormal Pap smear of cervix     Normal paps  and     Decreased fetal movement affecting management of pregnancy in second trimester, fetus 1 2024       PSH:    No past surgical history on file.    ObHx:    , expecting a baby  girl      OB History    Para Term  AB Living   2 1 1 0 0 1   SAB IAB Ectopic Multiple Live Births   0 0 0 0 1      # Outcome Date GA Lbr Ajith/2nd Weight Sex Type Anes PTL Lv   2 Current            1 Term 10/19/22 39w6d  2892 g (6 lb 6 oz) F Vag-Spont EPI N JULIO      Birth Comments: induction (oligohydrmanios)      Name: Linh       GynHx:       FH:    There is no family history of birth defects         Family History   Problem Relation Age of Onset    Cancer Mother         skin    Thyroid disease Mother         Graves disease    Cancer Father         skin    Hypertension Father     Kidney failure Father     No Known Problems Daughter     No Known Problems Maternal Grandmother     Alcohol abuse Maternal Grandfather     Colon cancer Paternal Grandmother         colon       SH:    She does not use tobacco, alcohol, or illicit drugs         Social History  "    Socioeconomic History    Marital status: Single     Spouse name: Not on file    Number of children: Not on file    Years of education: Not on file    Highest education level: Not on file   Occupational History    Not on file   Tobacco Use    Smoking status: Never     Passive exposure: Never    Smokeless tobacco: Never    Tobacco comments:     No history with tobacco   Vaping Use    Vaping status: Never Used   Substance and Sexual Activity    Alcohol use: Not Currently     Comment: No alcohol use since January 2022    Drug use: Never    Sexual activity: Not Currently     Partners: Male     Birth control/protection: None   Other Topics Concern    Not on file   Social History Narrative    Not on file     Social Determinants of Health     Financial Resource Strain: Low Risk  (2/21/2024)    Received from [facility]    Overall Financial Resource Strain (CARDIA)     Difficulty of Paying Living Expenses: Not hard at all   Food Insecurity: No Food Insecurity (5/15/2024)    Hunger Vital Sign     Worried About Running Out of Food in the Last Year: Never true     Ran Out of Food in the Last Year: Never true   Transportation Needs: No Transportation Needs (5/15/2024)    PRAPARE - Transportation     Lack of Transportation (Medical): No     Lack of Transportation (Non-Medical): No   Physical Activity: Not on file   Stress: Not on file   Social Connections: Not on file   Intimate Partner Violence: Not on file   Housing Stability: High Risk (5/15/2024)    Housing Stability Vital Sign     Unable to Pay for Housing in the Last Year: No     Number of Times Moved in the Last Year: 2     Homeless in the Last Year: No       RoS:    Constitutional: Negative    CV: Negative    Pulm: Negative    GI: Negative    Urinary: Negative    Neuro: Negative    Musculoskeletal: Negative    Physical Exam:  /76 (BP Location: Right arm)   Pulse 93   Temp 98.2 °F (36.8 °C) (Oral)   Resp 18   Ht 5' 4\" (1.626 m)   Wt 79.8 kg " (176 lb)   LMP 01/04/2024   BMI 30.21 kg/m²      General: AOx3, NAD  Lungs: CTAB  CV: RRR  Abdomen: soft, gravid, nontender  Extremities: nontender without edema bilaterally    Pelvis adequate          Cervix:           Dilation: 3cm         Effacement: 70%         Station: -3  Consistency:           Position:     Presentation: Vertex         Ultrasound:  Bedside US confirms active fetus in cephalic position    Prenatal labs:  Lab Results   Component Value Date    ABO O 03/29/2024    ABS Normal 03/29/2024    HGB 11.1 07/19/2024     07/19/2024    EXTRUBELIGGQ Immune 05/22/2024    RPR Non Reactive 07/19/2024    HEPBSAG NR 03/29/2024    HEPCAB NR 03/29/2024    GC negative 03/29/2024    [id number] 106 07/19/2024

## 2024-10-04 NOTE — TELEPHONE ENCOUNTER
"39w1d OB patient calling in with decreased fetal movement since this afternoon. Last felt movement few hours ago. Patient states she tried eating, drinking, laying down for past 2 hours. States only felt very slight possible movement once in past 2 hours. Denies leakage of fluid, vaginal bleeding, contractions.     Advised L&D for evaluation. Patient verbalized understanding. ETA: 4 PM.  ESC sent to on-call and charge RN as paulette.  Reason for Disposition   Pregnant 23 or more weeks and baby moving less today by kick count (e.g., kick count < 5 in 1 hour or < 10 in 2 hours)    Answer Assessment - Initial Assessment Questions  1. FETAL MOVEMENT: \"Has the baby's movement decreased or changed significantly from normal?\" (e.g., yes, no; describe)      Yes  2. RYLAND: \"What date are you expecting to deliver?\"       10/10/24  3. PREGNANCY: \"How many weeks pregnant are you?\"       39.1w  4. OTHER SYMPTOMS: \"Do you have any other symptoms?\" (e.g., abdominal pain, leaking fluid from vagina, vaginal bleeding, etc.)      Denies    Protocols used: Pregnancy - Decreased Fetal Movement-ADULT-[place]    "

## 2024-10-04 NOTE — PROCEDURES
Vinay Jason, a  at 39w1d with an RYLAND of 10/10/2024, Date entered prior to episode creation, was seen at [facility] for the following procedure(s): $Procedure Type: NST]    Nonstress Test  Reason for NST: Other (Comment) (decreased fetal movement)  Variability: Moderate  Decelerations: None  Accelerations: Yes  Acoustic Stimulator: No  Baseline: 150 BPM  Uterine Irritability: Yes  Contractions: Not present    4 Quadrant JALEN  LVP (cm):  (cephalic on bedside US)              Interpretation  Nonstress Test Interpretation: Reactive (reactive at end of strip)  Overall Impression: Reassuring

## 2024-10-04 NOTE — ASSESSMENT & PLAN NOTE
- Admit, IVF and labs  - SVE 3/70/-3  - bedside US confirms cephalic presentation  - EFM Category 1  - TOCO: no contractions but irritability presents  - GBS negative  - desires epidural when active  - expecting girl

## 2024-10-04 NOTE — H&P
"Triage Note - OB  Vinay Jason 34 y.o. female MRN: [id number]  Unit/Bed#: LD TRIAGE 1- Encounter: [id number]        ASSESSMENT/PLAN  Vinay Jason is a 34 y.o.  at 39w1d with decreased fetal movement.     Discharge instructions  - Patient instructed to call if experiencing worsening contractions, vaginal bleeding, loss of fluid or decreased fetal movement.  - Will follow up with OBGYN in office      She is a patient of [facility]  D/w Dr. Frias, on call OBGYN Attending Physician  ______________    SUBJECTIVE    RYLAND: Estimated Date of Delivery: 10/10/24    HPI:  34 y.o.  39w1d presents with complaint of decreased fetal movement over the past few hours despite eating and drinking.      Her obstetrical history is significant for:  Patient Active Problem List   Diagnosis    38 weeks gestation of pregnancy    Pregnancy resulting from in vitro fertilization in third trimester    OB Transfer of care at 17 weeks from [facility]    Intermittent palpitations    Decreased fetal movement affecting management of pregnancy in third trimester         ROS:  Constitutional: Negative  Respiratory: Negative  Cardiovascular: Negative    Gastrointestinal: Negative     Physical Exam  General: Well appearing, no distress  Respiratory: normal respiratory effort  Abdomen: Soft, gravid, nontender    Extremities: Warm and well perfused.  Non tender.      OBJECTIVE:  LMP 2024   There is no height or weight on file to calculate BMI.  Labs: No results found for this or any previous visit (from the past 24 hour(s)).      SVE:       FHT:       TOCO:        IMAGING:       Dai No MD  OB/GYN   10/4/2024  4:17 PM      Portions of the record may have been created with voice recognition software.  Occasional wrong word or \"sound a like\" substitutions may have occurred due to the inherent limitations of voice recognition software.  Read the chart carefully and recognize, using context, where " substitutions have occurred

## 2024-10-05 ENCOUNTER — ANESTHESIA (INPATIENT)
Dept: ANESTHESIOLOGY | Facility: HOSPITAL | Age: 34
End: 2024-10-05
Payer: COMMERCIAL

## 2024-10-05 ENCOUNTER — ANESTHESIA EVENT (INPATIENT)
Dept: ANESTHESIOLOGY | Facility: HOSPITAL | Age: 34
End: 2024-10-05
Payer: COMMERCIAL

## 2024-10-05 LAB
ATRIAL RATE: 92 BPM
BACTERIA UR CULT: NORMAL
BASE EXCESS BLDCOV CALC-SCNC: -4.3 MMOL/L (ref 1–9)
HCO3 BLDCOV-SCNC: 20.8 MMOL/L (ref 12.2–28.6)
OXYHGB MFR BLDCOV: 68.1 %
P AXIS: 8 DEGREES
PCO2 BLDCOV: 38.3 MM HG (ref 27–43)
PH BLDCOV: 7.35 [PH] (ref 7.19–7.49)
PO2 BLDCOV: 28.6 MM HG (ref 15–45)
PR INTERVAL: 128 MS
QRS AXIS: 22 DEGREES
QRSD INTERVAL: 86 MS
QT INTERVAL: 366 MS
QTC INTERVAL: 452 MS
SAO2 % BLDCOV: 15 ML/DL
T WAVE AXIS: 42 DEGREES
VENTRICULAR RATE: 92 BPM

## 2024-10-05 PROCEDURE — 59400 OBSTETRICAL CARE: CPT | Performed by: OBSTETRICS & GYNECOLOGY

## 2024-10-05 PROCEDURE — 0HQ9XZZ REPAIR PERINEUM SKIN, EXTERNAL APPROACH: ICD-10-PCS | Performed by: OBSTETRICS & GYNECOLOGY

## 2024-10-05 PROCEDURE — 82805 BLOOD GASES W/O2 SATURATION: CPT | Performed by: OBSTETRICS & GYNECOLOGY

## 2024-10-05 PROCEDURE — 93010 ELECTROCARDIOGRAM REPORT: CPT | Performed by: INTERNAL MEDICINE

## 2024-10-05 PROCEDURE — 99024 POSTOP FOLLOW-UP VISIT: CPT | Performed by: OBSTETRICS & GYNECOLOGY

## 2024-10-05 RX ORDER — PHENYLEPHRINE HCL IN 0.9% NACL 1 MG/10 ML
SYRINGE (ML) INTRAVENOUS AS NEEDED
Status: DISCONTINUED | OUTPATIENT
Start: 2024-10-05 | End: 2024-10-05 | Stop reason: HOSPADM

## 2024-10-05 RX ORDER — OXYTOCIN/RINGER'S LACTATE 30/500 ML
250 PLASTIC BAG, INJECTION (ML) INTRAVENOUS ONCE
Status: COMPLETED | OUTPATIENT
Start: 2024-10-05 | End: 2024-10-05

## 2024-10-05 RX ORDER — MAGNESIUM HYDROXIDE/ALUMINUM HYDROXICE/SIMETHICONE 120; 1200; 1200 MG/30ML; MG/30ML; MG/30ML
15 SUSPENSION ORAL EVERY 6 HOURS PRN
Status: DISCONTINUED | OUTPATIENT
Start: 2024-10-05 | End: 2024-10-06 | Stop reason: HOSPADM

## 2024-10-05 RX ORDER — ACETAMINOPHEN 325 MG/1
650 TABLET ORAL EVERY 6 HOURS PRN
Status: DISCONTINUED | OUTPATIENT
Start: 2024-10-05 | End: 2024-10-06 | Stop reason: HOSPADM

## 2024-10-05 RX ORDER — DIPHENHYDRAMINE HCL 25 MG
25 TABLET ORAL EVERY 6 HOURS PRN
Status: DISCONTINUED | OUTPATIENT
Start: 2024-10-05 | End: 2024-10-06 | Stop reason: HOSPADM

## 2024-10-05 RX ORDER — OXYTOCIN/RINGER'S LACTATE 30/500 ML
125 PLASTIC BAG, INJECTION (ML) INTRAVENOUS ONCE
Status: COMPLETED | OUTPATIENT
Start: 2024-10-05 | End: 2024-10-05

## 2024-10-05 RX ORDER — BUPIVACAINE HYDROCHLORIDE 7.5 MG/ML
INJECTION, SOLUTION INTRASPINAL AS NEEDED
Status: DISCONTINUED | OUTPATIENT
Start: 2024-10-05 | End: 2024-10-05 | Stop reason: HOSPADM

## 2024-10-05 RX ORDER — IBUPROFEN 600 MG/1
600 TABLET, FILM COATED ORAL EVERY 6 HOURS PRN
Status: DISCONTINUED | OUTPATIENT
Start: 2024-10-05 | End: 2024-10-06 | Stop reason: HOSPADM

## 2024-10-05 RX ORDER — LIDOCAINE HYDROCHLORIDE 20 MG/ML
INJECTION, SOLUTION EPIDURAL; INFILTRATION; INTRACAUDAL; PERINEURAL AS NEEDED
Status: DISCONTINUED | OUTPATIENT
Start: 2024-10-05 | End: 2024-10-05 | Stop reason: HOSPADM

## 2024-10-05 RX ORDER — CALCIUM CARBONATE 500 MG/1
1000 TABLET, CHEWABLE ORAL DAILY PRN
Status: DISCONTINUED | OUTPATIENT
Start: 2024-10-05 | End: 2024-10-06 | Stop reason: HOSPADM

## 2024-10-05 RX ORDER — SIMETHICONE 80 MG
80 TABLET,CHEWABLE ORAL 4 TIMES DAILY PRN
Status: DISCONTINUED | OUTPATIENT
Start: 2024-10-05 | End: 2024-10-06 | Stop reason: HOSPADM

## 2024-10-05 RX ORDER — DOCUSATE SODIUM 100 MG/1
100 CAPSULE, LIQUID FILLED ORAL 2 TIMES DAILY
Status: DISCONTINUED | OUTPATIENT
Start: 2024-10-05 | End: 2024-10-06 | Stop reason: HOSPADM

## 2024-10-05 RX ORDER — LIDOCAINE HYDROCHLORIDE AND EPINEPHRINE 15; 5 MG/ML; UG/ML
INJECTION, SOLUTION EPIDURAL AS NEEDED
Status: DISCONTINUED | OUTPATIENT
Start: 2024-10-05 | End: 2024-10-05 | Stop reason: HOSPADM

## 2024-10-05 RX ORDER — BISACODYL 10 MG
10 SUPPOSITORY, RECTAL RECTAL DAILY PRN
Status: DISCONTINUED | OUTPATIENT
Start: 2024-10-05 | End: 2024-10-06 | Stop reason: HOSPADM

## 2024-10-05 RX ORDER — BENZOCAINE/MENTHOL 6 MG-10 MG
1 LOZENGE MUCOUS MEMBRANE DAILY PRN
Status: DISCONTINUED | OUTPATIENT
Start: 2024-10-05 | End: 2024-10-06 | Stop reason: HOSPADM

## 2024-10-05 RX ORDER — ROPIVACAINE HYDROCHLORIDE 2 MG/ML
INJECTION, SOLUTION EPIDURAL; INFILTRATION; PERINEURAL CONTINUOUS PRN
Status: DISCONTINUED | OUTPATIENT
Start: 2024-10-05 | End: 2024-10-05 | Stop reason: HOSPADM

## 2024-10-05 RX ADMIN — SODIUM CHLORIDE, SODIUM LACTATE, POTASSIUM CHLORIDE, AND CALCIUM CHLORIDE 125 ML/HR: .6; .31; .03; .02 INJECTION, SOLUTION INTRAVENOUS at 03:05

## 2024-10-05 RX ADMIN — Medication 200 MCG: at 01:35

## 2024-10-05 RX ADMIN — WITCH HAZEL 1 PAD: 500 SOLUTION RECTAL; TOPICAL at 06:06

## 2024-10-05 RX ADMIN — Medication 250 MILLI-UNITS/MIN: at 03:20

## 2024-10-05 RX ADMIN — Medication 200 MCG: at 01:40

## 2024-10-05 RX ADMIN — ACETAMINOPHEN 650 MG: 325 TABLET ORAL at 17:55

## 2024-10-05 RX ADMIN — Medication 200 MCG: at 01:45

## 2024-10-05 RX ADMIN — IBUPROFEN 600 MG: 600 TABLET, FILM COATED ORAL at 23:11

## 2024-10-05 RX ADMIN — LIDOCAINE HYDROCHLORIDE,EPINEPHRINE BITARTRATE 5 ML: 15; .005 INJECTION, SOLUTION EPIDURAL; INFILTRATION; INTRACAUDAL; PERINEURAL at 01:31

## 2024-10-05 RX ADMIN — BUPIVACAINE HYDROCHLORIDE IN DEXTROSE 0.8 ML: 7.5 INJECTION, SOLUTION SUBARACHNOID at 01:30

## 2024-10-05 RX ADMIN — IBUPROFEN 600 MG: 600 TABLET, FILM COATED ORAL at 10:11

## 2024-10-05 RX ADMIN — SODIUM CHLORIDE, SODIUM LACTATE, POTASSIUM CHLORIDE, AND CALCIUM CHLORIDE 125 ML/HR: .6; .31; .03; .02 INJECTION, SOLUTION INTRAVENOUS at 01:21

## 2024-10-05 RX ADMIN — DOCUSATE SODIUM 100 MG: 100 CAPSULE, LIQUID FILLED ORAL at 17:55

## 2024-10-05 RX ADMIN — DOCUSATE SODIUM 100 MG: 100 CAPSULE, LIQUID FILLED ORAL at 08:17

## 2024-10-05 RX ADMIN — ROPIVACAINE HYDROCHLORIDE 12 ML/HR: 2 INJECTION, SOLUTION EPIDURAL; INFILTRATION at 01:40

## 2024-10-05 RX ADMIN — ACETAMINOPHEN 650 MG: 325 TABLET ORAL at 06:06

## 2024-10-05 RX ADMIN — Medication 125 MILLI-UNITS/MIN: at 04:56

## 2024-10-05 RX ADMIN — IBUPROFEN 600 MG: 600 TABLET, FILM COATED ORAL at 04:19

## 2024-10-05 RX ADMIN — LIDOCAINE HYDROCHLORIDE 5 ML: 20 INJECTION, SOLUTION EPIDURAL; INFILTRATION; INTRACAUDAL; PERINEURAL at 02:01

## 2024-10-05 RX ADMIN — ONDANSETRON 4 MG: 2 INJECTION, SOLUTION INTRAMUSCULAR; INTRAVENOUS at 01:46

## 2024-10-05 RX ADMIN — IBUPROFEN 600 MG: 600 TABLET, FILM COATED ORAL at 16:22

## 2024-10-05 RX ADMIN — ACETAMINOPHEN 650 MG: 325 TABLET ORAL at 12:03

## 2024-10-05 RX ADMIN — SODIUM CHLORIDE, SODIUM LACTATE, POTASSIUM CHLORIDE, AND CALCIUM CHLORIDE 1000 ML: .6; .31; .03; .02 INJECTION, SOLUTION INTRAVENOUS at 00:24

## 2024-10-05 NOTE — OB LABOR/OXYTOCIN SAFETY PROGRESS
"Labor Progress Note - Vinay Jason 34 y.o. female MRN: [id number]    Unit/Bed#: -01 Encounter: [id number]    Dose (dominga-units/min) Oxytocin: 10 dominga-units/min  Contraction Frequency (minutes): 2-6  Contraction Intensity: Mild  Uterine Activity Characteristics: Irritability (irregular)    Cervical Dilation: 4  Cervical Effacement: 70  Fetal Station: -2    Baseline Rate (FHR): 155 bpm  Fetal Heart Rate (FHT): 160 BPM  FHR Category: 1               Vital Signs:   Vitals:    10/04/24 2115   BP: 123/77   Pulse:    Resp:    Temp:        Notes/comments:     Patient feeling contractions but \"not painful\".  AROM'd for clear fluid after discussing with patient. Continue pitocin.  Epidural prn.      Tiny Frias MD 10/4/2024 10:19 PM      "

## 2024-10-05 NOTE — PROGRESS NOTES
Progress Note - OB/GYN   Name: Vinay Jason 34 y.o. female I MRN: [id number]  Unit/Bed#: -01 I Date of Admission: 10/4/2024   Date of Service: 10/5/2024 I Hospital Day: 1    Assessment & Plan  Encounter for induction of labor  - Admit, IVF and labs  - SVE 3/70/-3  - bedside US confirms cephalic presentation  - EFM Category 1  - TOCO: no contractions but irritability presents  - GBS negative  - desires epidural when active  - expecting girl  Intermittent palpitations  - denies symptoms at this time  - No murmur auscultated on exam today and HR 93  - initially was to be referred to cardiology, but has since increased her salt and electrolytes intake with saltier foods and gatorade, and has felt much better  - will get an ECG on this admission to serve as baseline  - will check CMP along with labs  - will check TSH and free T4 with labs  Decreased fetal movement affecting management of pregnancy in third trimester    Pregnancy resulting from in vitro fertilization in third trimester    First-degree perineal laceration, postpartum  No c/o. For D/C tomorrow    OB Post-Partum Progress Note  Subjective   Post delivery. Patient is doing well. Lochia WNL. Pain well controlled.    Pain: yes, cramping, improved with meds  Tolerating PO: yes  Voiding: yes  Flatus: yes  BM: no  Ambulating: yes  Breastfeeding:  yes  Chest pain: no  Shortness of breath: no  Leg pain: no  Lochia: WNL    Objective :  Temp:  [97.7 °F (36.5 °C)-98.8 °F (37.1 °C)] 97.7 °F (36.5 °C)  HR:  [] 73  BP: ()/(53-87) 117/73  Resp:  [18] 18  SpO2:  [97 %-98 %] 98 %  O2 Device: None (Room air)    Physical Exam  Vitals and nursing note reviewed.   Constitutional:       Appearance: Normal appearance.   HENT:      Head: Normocephalic.   Pulmonary:      Effort: Pulmonary effort is normal.   Abdominal:      General: Bowel sounds are normal. There is no distension.      Palpations: Abdomen is soft.      Tenderness: There is no abdominal tenderness.       Comments: Fundus U-1   Neurological:      Mental Status: She is alert.       Lab Results: I have reviewed the following results:  Lab Results   Component Value Date    WBC 9.90 10/04/2024    HGB 10.5 (L) 10/04/2024    HCT 33.5 (L) 10/04/2024    MCV 81 (L) 10/04/2024     10/04/2024

## 2024-10-05 NOTE — ANESTHESIA POSTPROCEDURE EVALUATION
Post-Op Assessment Note    CV Status:  Stable    Pain management: adequate      Post-op block assessment: catheter intact and no complications   Mental Status:  Alert and awake   Hydration Status:  Euvolemic   PONV Controlled:  Controlled   Airway Patency:  Patent     Post Op Vitals Reviewed: Yes    No anethesia notable event occurred.    Staff: Anesthesiologist                 Vitals:    10/05/24 0445 10/05/24 0500 10/05/24 0529 10/05/24 0559   BP: 123/70 133/63 111/64 114/61   Pulse:   78 77   Resp:       Temp:       TempSrc:       Weight:       Height:

## 2024-10-05 NOTE — PLAN OF CARE
Problem: PAIN - ADULT  Goal: Verbalizes/displays adequate comfort level or baseline comfort level  Description: Interventions:  - Encourage patient to monitor pain and request assistance  - Assess pain using appropriate pain scale  - Administer analgesics based on type and severity of pain and evaluate response  - Implement non-pharmacological measures as appropriate and evaluate response  - Consider cultural and social influences on pain and pain management  - Notify physician/advanced practitioner if interventions unsuccessful or patient reports new pain  Outcome: Progressing     Problem: INFECTION - ADULT  Goal: Absence or prevention of progression during hospitalization  Description: INTERVENTIONS:  - Assess and monitor for signs and symptoms of infection  - Monitor lab/diagnostic results  - Monitor all insertion sites, i.e. indwelling lines, tubes, and drains  - Monitor endotracheal if appropriate and nasal secretions for changes in amount and color  - [place] appropriate cooling/warming therapies per order  - Administer medications as ordered  - Instruct and encourage patient and family to use good hand hygiene technique  - Identify and instruct in appropriate isolation precautions for identified infection/condition  Outcome: Progressing  Goal: Absence of fever/infection during neutropenic period  Description: INTERVENTIONS:  - Monitor WBC    Outcome: Progressing     Problem: SAFETY ADULT  Goal: Patient will remain free of falls  Description: INTERVENTIONS:  - Educate patient/family on patient safety including physical limitations  - Instruct patient to call for assistance with activity   - Consult OT/PT to assist with strengthening/mobility   - Keep Call bell within reach  - Keep bed low and locked with side rails adjusted as appropriate  - Keep care items and personal belongings within reach  - Initiate and maintain comfort rounds  - Make Fall Risk Sign visible to staff  - Apply yellow socks and bracelet  for high fall risk patients  - Consider moving patient to room near nurses station  Outcome: Progressing  Goal: Maintain or return to baseline ADL function  Description: INTERVENTIONS:  -  Assess patient's ability to carry out ADLs; assess patient's baseline for ADL function and identify physical deficits which impact ability to perform ADLs (bathing, care of mouth/teeth, toileting, grooming, dressing, etc.)  - Assess/evaluate cause of self-care deficits   - Assess range of motion  - Assess patient's mobility; develop plan if impaired  - Assess patient's need for assistive devices and provide as appropriate  - Encourage maximum independence but intervene and supervise when necessary  - Involve family in performance of ADLs  - Assess for home care needs following discharge   - Consider OT consult to assist with ADL evaluation and planning for discharge  - Provide patient education as appropriate  Outcome: Progressing  Goal: Maintains/Returns to pre admission functional level  Description: INTERVENTIONS:  - Perform AM-PAC 6 Click Basic Mobility/ Daily Activity assessment daily.  - Set and communicate daily mobility goal to care team and patient/family/caregiver.   - Collaborate with rehabilitation services on mobility goals if consulted  - Out of bed for toileting  - Record patient progress and toleration of activity level   Outcome: Progressing     Problem: DISCHARGE PLANNING  Goal: Discharge to home or other facility with appropriate resources  Description: INTERVENTIONS:  - Identify barriers to discharge w/patient and caregiver  - Arrange for needed discharge resources and transportation as appropriate  - Identify discharge learning needs (meds, wound care, etc.)  - Arrange for interpretive services to assist at discharge as needed  - Refer to Case Management Department for coordinating discharge planning if the patient needs post-hospital services based on physician/advanced practitioner order or complex needs  related to functional status, cognitive ability, or social support system  Outcome: Progressing     Problem: Knowledge Deficit  Goal: Patient/family/caregiver demonstrates understanding of disease process, treatment plan, medications, and discharge instructions  Description: Complete learning assessment and assess knowledge base.  Interventions:  - Provide teaching at level of understanding  - Provide teaching via preferred learning methods  Outcome: Progressing     Problem: POSTPARTUM  Goal: Experiences normal postpartum course  Description: INTERVENTIONS:  - Monitor maternal vital signs  - Assess uterine involution and lochia  Outcome: Progressing  Goal: Appropriate maternal -  bonding  Description: INTERVENTIONS:  - Identify family support  - Assess for appropriate maternal/infant bonding   -Encourage maternal/infant bonding opportunities  - Referral to  or  as needed  Outcome: Progressing  Goal: Establishment of infant feeding pattern  Description: INTERVENTIONS:  - Assess breast/bottle feeding  - Refer to lactation as needed  Outcome: Progressing  Goal: Incision(s), wounds(s) or drain site(s) healing without S/S of infection  Description: INTERVENTIONS  - Assess and document dressing, incision, wound bed, drain sites and surrounding tissue  - Provide patient and family education  Outcome: Progressing

## 2024-10-05 NOTE — PLAN OF CARE
Problem: PAIN - ADULT  Goal: Verbalizes/displays adequate comfort level or baseline comfort level  Description: Interventions:  - Encourage patient to monitor pain and request assistance  - Assess pain using appropriate pain scale  - Administer analgesics based on type and severity of pain and evaluate response  - Implement non-pharmacological measures as appropriate and evaluate response  - Consider cultural and social influences on pain and pain management  - Notify physician/advanced practitioner if interventions unsuccessful or patient reports new pain  Outcome: Progressing     Problem: INFECTION - ADULT  Goal: Absence or prevention of progression during hospitalization  Description: INTERVENTIONS:  - Assess and monitor for signs and symptoms of infection  - Monitor lab/diagnostic results  - Monitor all insertion sites, i.e. indwelling lines, tubes, and drains  - Monitor endotracheal if appropriate and nasal secretions for changes in amount and color  - [place] appropriate cooling/warming therapies per order  - Administer medications as ordered  - Instruct and encourage patient and family to use good hand hygiene technique  - Identify and instruct in appropriate isolation precautions for identified infection/condition  Outcome: Progressing  Goal: Absence of fever/infection during neutropenic period  Description: INTERVENTIONS:  - Monitor WBC    Outcome: Progressing     Problem: SAFETY ADULT  Goal: Patient will remain free of falls  Description: INTERVENTIONS:  - Educate patient/family on patient safety including physical limitations  - Instruct patient to call for assistance with activity   - Consult OT/PT to assist with strengthening/mobility   - Keep Call bell within reach  - Keep bed low and locked with side rails adjusted as appropriate  - Keep care items and personal belongings within reach  - Initiate and maintain comfort rounds  - Make Fall Risk Sign visible to staff  - Offer Toileting every  Hours,  in advance of need  - Initiate/Maintain alarm  - Obtain necessary fall risk management equipment:   - Apply yellow socks and bracelet for high fall risk patients  - Consider moving patient to room near nurses station  Outcome: Progressing  Goal: Maintain or return to baseline ADL function  Description: INTERVENTIONS:  -  Assess patient's ability to carry out ADLs; assess patient's baseline for ADL function and identify physical deficits which impact ability to perform ADLs (bathing, care of mouth/teeth, toileting, grooming, dressing, etc.)  - Assess/evaluate cause of self-care deficits   - Assess range of motion  - Assess patient's mobility; develop plan if impaired  - Assess patient's need for assistive devices and provide as appropriate  - Encourage maximum independence but intervene and supervise when necessary  - Involve family in performance of ADLs  - Assess for home care needs following discharge   - Consider OT consult to assist with ADL evaluation and planning for discharge  - Provide patient education as appropriate  Outcome: Progressing  Goal: Maintains/Returns to pre admission functional level  Description: INTERVENTIONS:  - Perform AM-PAC 6 Click Basic Mobility/ Daily Activity assessment daily.  - Set and communicate daily mobility goal to care team and patient/family/caregiver.   - Collaborate with rehabilitation services on mobility goals if consulted  - Perform Range of Motion  times a day.  - Reposition patient every  hours.  - Dangle patient  times a day  - Stand patient times a day  - Ambulate patient  times a day  - Out of bed to chair  times a day   - Out of bed for meals  times a day  - Out of bed for toileting  - Record patient progress and toleration of activity level   Outcome: Progressing     Problem: DISCHARGE PLANNING  Goal: Discharge to home or other facility with appropriate resources  Description: INTERVENTIONS:  - Identify barriers to discharge w/patient and caregiver  - Arrange for  needed discharge resources and transportation as appropriate  - Identify discharge learning needs (meds, wound care, etc.)  - Arrange for interpretive services to assist at discharge as needed  - Refer to Case Management Department for coordinating discharge planning if the patient needs post-hospital services based on physician/advanced practitioner order or complex needs related to functional status, cognitive ability, or social support system  Outcome: Progressing     Problem: Knowledge Deficit  Goal: Patient/family/caregiver demonstrates understanding of disease process, treatment plan, medications, and discharge instructions  Description: Complete learning assessment and assess knowledge base.  Interventions:  - Provide teaching at level of understanding  - Provide teaching via preferred learning methods  Outcome: Progressing     Problem: BIRTH - VAGINAL/ SECTION  Goal: Fetal and maternal status remain reassuring during the birth process  Description: INTERVENTIONS:  - Monitor vital signs  - Monitor fetal heart rate  - Monitor uterine activity  - Monitor labor progression (vaginal delivery)  - DVT prophylaxis  - Antibiotic prophylaxis  Outcome: Progressing  Goal: Emotionally satisfying birthing experience for mother/fetus  Description: Interventions:  - Assess, plan, implement and evaluate the nursing care given to the patient in labor  - Advocate the philosophy that each childbirth experience is a unique experience and support the family's chosen level of involvement and control during the labor process   - Actively participate in both the patient's and family's teaching of the birth process  - Consider cultural, [place] and age-specific factors and plan care for the patient in labor  Outcome: Progressing

## 2024-10-05 NOTE — OB LABOR/OXYTOCIN SAFETY PROGRESS
Labor Progress Note - Vinay Jason 34 y.o. female MRN: [id number]    Unit/Bed#: -01 Encounter: [id number]    Dose (dominga-units/min) Oxytocin: 10 dominga-units/min  Contraction Frequency (minutes): 1-4  Contraction Intensity: Mild  Uterine Activity Characteristics: Irritability (irregular)    Cervical Dilation: 4  Cervical Effacement: 70  Fetal Station: -2    Baseline Rate (FHR): 145 bpm  Fetal Heart Rate (FHT): 160 BPM  FHR Category: 1               Vital Signs:   Vitals:    10/04/24 2300   BP: 133/87   Pulse:    Resp:    Temp:        Notes/comments:     Continue pitocin.  FHT Cat I.  Epidural prn.      Tiny Frias MD 10/4/2024 11:50 PM

## 2024-10-05 NOTE — ASSESSMENT & PLAN NOTE
- denies symptoms at this time  - No murmur auscultated on exam today and HR 93  - initially was to be referred to cardiology, but has since increased her salt and electrolytes intake with saltier foods and gatorade, and has felt much better  - will get an ECG on this admission to serve as baseline  - will check CMP along with labs  - will check TSH and free T4 with labs

## 2024-10-05 NOTE — L&D DELIVERY NOTE
Vaginal Delivery Summary - OB/GYN   Vinay Jason 34 y.o. female MRN: [id number]  Unit/Bed#: -01 Encounter: [id number]    Predelivery Diagnosis:  1. Pregnancy at 39w2d  2. Single fetus     Postdelivery Diagnosis:  1. Same as above - Delivered  2. 1st degree laceration    Procedure: Spontaneous Vaginal Delivery, repair of first degree laceration    Attending: Dr. Tiny Frias    Anesthesia: Epidural    QBL: 246cc  Admission Hg:   Recent Labs     10/04/24  1750   HGB 10.5*     Admission platelets:   Recent Labs     10/04/24  1750            Complications: none apparent    Specimens: cord blood, arterial and venous cord blood gasses, placenta to storage    Findings:   1. Viable female at 03;18, with APGARS of 8 (1 min) and 9 (5 min),  2. Spontaneous delivery of intact placenta at 3:23  3. first degree laceration repaired with 3.0vicryl  4. Blood gases:    Umbilical Cord Venous Blood Gas:  Results from last 7 days   Lab Units 10/05/24  0319   PH COV  7.352   PCO2 COV mm HG 38.3   HCO3 COV mmol/L 20.8   BASE EXC COV mmol/L -4.3*   O2 CT CD VB mL/dL 15.0   O2 HGB, VENOUS CORD % 68.1     Umbilical Cord Arterial Blood Gas:    Quantity insufficent    Disposition:  Patient tolerated the procedure well and was recovering in labor and delivery room     Brief History and Labor Course:    Vinay Jason is a 34 y.o.  with an RYLAND of 10/10/2024, Date entered prior to episode creation at 39w2d gestation who was admitted for Induction of labor for decreased fetal movement.    Please see labor timeline for complete labor course.     The patient was completely dilated at 03:05. She began to push at 03:10.     Description of procedure    After pushing for 8 minutes, at 03;18 patient delivered a viable female , wt pending, apgars of 8 (1 min) and 9 (5 min). The fetal vertex delivered spontaneously. The baby was palpated for a nuchal cord and none was palpated.  The anterior shoulder delivered atraumatically  with maternal expulsive forces and the assistance of downward traction. The posterior shoulder delivered with maternal expulsive forces and the assistance of upward traction. The remainder of the fetus delivered spontaneously.     Upon delivery, the infant was placed on the maternal abdomen and delayed cord clamping was performed.  The umbilical cord was then doubly clamped and cut.  The infant was noted to cry spontaneously and was moving all extremities appropriately. Arterial and venous cord blood gases and cord blood was collected for analysis. These were promptly sent to the lab. In the immediate post-partum, 30 units of IV pitocin was administered, and the uterus was noted to contract down well with massage and pitocin. The placenta delivered spontaneously at 03:23 and was noted to have a centrally inserted 3 vessel cord.     The vagina, cervix, perineum, and rectum were inspected and there was noted to be a 1st degree.  The laceration was repaired with 3.0 vicryl on a CT needle.  Good hemostasis was noted.     At the conclusion of the procedure, all needle, sponge, and instrument counts were noted to be correct. Patient tolerated the procedure well and was allowed to recover in labor and delivery room with family and  before being transferred to the post-partum floor.     Tiny Frias MD

## 2024-10-05 NOTE — OB LABOR/OXYTOCIN SAFETY PROGRESS
Labor Progress Note - Vinay Jason 34 y.o. female MRN: [id number]    Unit/Bed#: -01 Encounter: [id number]    Dose (dominga-units/min) Oxytocin: 6 dominga-units/min  Contraction Frequency (minutes): irregular  Contraction Intensity: Mild  Uterine Activity Characteristics: Irritability    Cervical Dilation: 3  Cervical Effacement: 70  Fetal Station: -3    Baseline Rate (FHR): 145 bpm  Fetal Heart Rate (FHT): 160 BPM  FHR Category: 1               Vital Signs:   Vitals:    10/04/24 2025   BP: 124/79   Pulse: 83   Resp:    Temp:        Notes/comments:     Inducing patient for decreased fetal movement at 39 weeks.    Patient not really feeling ctx yet.  Continue pitocin.  Will AROM when able.  Epidural prn.    Tiny Frias MD 10/4/2024 8:57 PM

## 2024-10-05 NOTE — ANESTHESIA PROCEDURE NOTES
CSE Block    Start time: 10/5/2024 1:30 AM  Reason for block: procedure for pain and at surgeon's request  Staffing  Performed by: Royer Amador MD  Authorized by: Royer Amador MD    Preanesthetic Checklist  Completed: patient identified, IV checked, site marked, risks and benefits discussed, surgical consent, monitors and equipment checked, pre-op evaluation and timeout performed  CSE  Patient position: sitting  Prep: ChloraPrep  Patient monitoring: cardiac monitor and continuous pulse ox  Approach: midline  Spinal Needle  Needle type: pencil-tip   Needle gauge: 27 G  Needle length: 10 cm  Epidural Needle  Needle type: Tuohy   Needle gauge: 18 G  Needle length: 9 cm  Needle insertion depth: 5 cm  Location: L4-L5  Catheter  Catheter type: side hole  Catheter size: 20 G  Catheter at skin depth: 10 cm  Test dose: negative  Assessment  Injection Assessment:  negative aspiration for heme, no paresthesia on injection, positive aspiration for clear CSF and no pain on injection.

## 2024-10-06 VITALS
WEIGHT: 176 LBS | DIASTOLIC BLOOD PRESSURE: 79 MMHG | RESPIRATION RATE: 18 BRPM | HEIGHT: 64 IN | HEART RATE: 75 BPM | SYSTOLIC BLOOD PRESSURE: 120 MMHG | OXYGEN SATURATION: 98 % | TEMPERATURE: 98 F | BODY MASS INDEX: 30.05 KG/M2

## 2024-10-06 LAB
ERYTHROCYTE [DISTWIDTH] IN BLOOD BY AUTOMATED COUNT: 14.3 % (ref 11.6–15.1)
HCT VFR BLD AUTO: 34.5 % (ref 34.8–46.1)
HGB BLD-MCNC: 10.5 G/DL (ref 11.5–15.4)
MCH RBC QN AUTO: 25.2 PG (ref 26.8–34.3)
MCHC RBC AUTO-ENTMCNC: 30.4 G/DL (ref 31.4–37.4)
MCV RBC AUTO: 83 FL (ref 82–98)
PLATELET # BLD AUTO: 197 THOUSANDS/UL (ref 149–390)
PMV BLD AUTO: 11.7 FL (ref 8.9–12.7)
RBC # BLD AUTO: 4.16 MILLION/UL (ref 3.81–5.12)
WBC # BLD AUTO: 8.66 THOUSAND/UL (ref 4.31–10.16)

## 2024-10-06 PROCEDURE — 85027 COMPLETE CBC AUTOMATED: CPT | Performed by: OBSTETRICS & GYNECOLOGY

## 2024-10-06 PROCEDURE — 99024 POSTOP FOLLOW-UP VISIT: CPT | Performed by: OBSTETRICS & GYNECOLOGY

## 2024-10-06 PROCEDURE — NC001 PR NO CHARGE: Performed by: OBSTETRICS & GYNECOLOGY

## 2024-10-06 RX ORDER — IBUPROFEN 600 MG/1
600 TABLET, FILM COATED ORAL EVERY 6 HOURS PRN
Qty: 30 TABLET | Refills: 0 | Status: SHIPPED | OUTPATIENT
Start: 2024-10-06

## 2024-10-06 RX ORDER — ACETAMINOPHEN 325 MG/1
650 TABLET ORAL EVERY 6 HOURS PRN
Qty: 30 TABLET | Refills: 0 | Status: SHIPPED | OUTPATIENT
Start: 2024-10-06

## 2024-10-06 RX ORDER — DOCUSATE SODIUM 100 MG/1
100 CAPSULE, LIQUID FILLED ORAL 2 TIMES DAILY
Qty: 30 CAPSULE | Refills: 0 | Status: SHIPPED | OUTPATIENT
Start: 2024-10-06

## 2024-10-06 RX ADMIN — ACETAMINOPHEN 650 MG: 325 TABLET ORAL at 14:32

## 2024-10-06 RX ADMIN — IBUPROFEN 600 MG: 600 TABLET, FILM COATED ORAL at 08:05

## 2024-10-06 RX ADMIN — DOCUSATE SODIUM 100 MG: 100 CAPSULE, LIQUID FILLED ORAL at 08:05

## 2024-10-06 RX ADMIN — ACETAMINOPHEN 650 MG: 325 TABLET ORAL at 04:35

## 2024-10-06 NOTE — PROGRESS NOTES
Progress Note - OB/GYN   Name: Vinay Jason 34 y.o. female I MRN: [id number]  Unit/Bed#: -01 I Date of Admission: 10/4/2024   Date of Service: 10/6/2024 I Hospital Day: 2     Assessment & Plan  Encounter for induction of labor    Intermittent palpitations    Decreased fetal movement affecting management of pregnancy in third trimester    Pregnancy resulting from in vitro fertilization in third trimester    First-degree perineal laceration, postpartum     (spontaneous vaginal delivery)  Doing well . Plan for discharge today.     OB Post-Partum Progress Note  Subjective   Post delivery. Patient is doing well. Lochia WNL. Pain well controlled.     Pain: yes, cramping, improved with meds  Tolerating PO: yes  Voiding: yes  Ambulating: yes  Breastfeeding:  yes  Chest pain: no  Shortness of breath: no  Leg pain: no  Lochia: WNL    Objective :  Temp:  [97.6 °F (36.4 °C)-97.9 °F (36.6 °C)] 97.8 °F (36.6 °C)  HR:  [58-80] 80  BP: (113-132)/(73-89) 120/78  Resp:  [16-18] 18  SpO2:  [98 %-100 %] 99 %  O2 Device: None (Room air)    Physical Exam  Vitals and nursing note reviewed.   Constitutional:       General: She is not in acute distress.     Appearance: She is well-developed.   HENT:      Head: Normocephalic and atraumatic.   Eyes:      Conjunctiva/sclera: Conjunctivae normal.   Pulmonary:      Effort: Pulmonary effort is normal. No respiratory distress.   Abdominal:      Palpations: Abdomen is soft.      Tenderness: There is no abdominal tenderness.   Musculoskeletal:         General: No swelling.      Cervical back: Neck supple.   Skin:     General: Skin is warm and dry.      Capillary Refill: Capillary refill takes less than 2 seconds.   Neurological:      Mental Status: She is alert.   Psychiatric:         Mood and Affect: Mood normal.           Lab Results: I have reviewed the following results:  Lab Results   Component Value Date    WBC 8.66 10/06/2024    HGB 10.5 (L) 10/06/2024    HCT 34.5 (L) 10/06/2024     MCV 83 10/06/2024     10/06/2024

## 2024-10-06 NOTE — PLAN OF CARE
Problem: PAIN - ADULT  Goal: Verbalizes/displays adequate comfort level or baseline comfort level  Description: Interventions:  - Encourage patient to monitor pain and request assistance  - Assess pain using appropriate pain scale  - Administer analgesics based on type and severity of pain and evaluate response  - Implement non-pharmacological measures as appropriate and evaluate response  - Consider cultural and social influences on pain and pain management  - Notify physician/advanced practitioner if interventions unsuccessful or patient reports new pain  Outcome: Progressing     Problem: INFECTION - ADULT  Goal: Absence or prevention of progression during hospitalization  Description: INTERVENTIONS:  - Assess and monitor for signs and symptoms of infection  - Monitor lab/diagnostic results  - Monitor all insertion sites, i.e. indwelling lines, tubes, and drains  - Monitor endotracheal if appropriate and nasal secretions for changes in amount and color  - [place] appropriate cooling/warming therapies per order  - Administer medications as ordered  - Instruct and encourage patient and family to use good hand hygiene technique  - Identify and instruct in appropriate isolation precautions for identified infection/condition  Outcome: Progressing  Goal: Absence of fever/infection during neutropenic period  Description: INTERVENTIONS:  - Monitor WBC    Outcome: Progressing     Problem: SAFETY ADULT  Goal: Patient will remain free of falls  Description: INTERVENTIONS:  - Educate patient/family on patient safety including physical limitations  - Instruct patient to call for assistance with activity   - Consult OT/PT to assist with strengthening/mobility   - Keep Call bell within reach  - Keep bed low and locked with side rails adjusted as appropriate  - Keep care items and personal belongings within reach  - Initiate and maintain comfort rounds  - Make Fall Risk Sign visible to staff    - Apply yellow socks and  bracelet for high fall risk patients  - Consider moving patient to room near nurses station  Outcome: Progressing  Goal: Maintain or return to baseline ADL function  Description: INTERVENTIONS:  -  Assess patient's ability to carry out ADLs; assess patient's baseline for ADL function and identify physical deficits which impact ability to perform ADLs (bathing, care of mouth/teeth, toileting, grooming, dressing, etc.)  - Assess/evaluate cause of self-care deficits   - Assess range of motion  - Assess patient's mobility; develop plan if impaired  - Assess patient's need for assistive devices and provide as appropriate  - Encourage maximum independence but intervene and supervise when necessary  - Involve family in performance of ADLs  - Assess for home care needs following discharge   - Consider OT consult to assist with ADL evaluation and planning for discharge  - Provide patient education as appropriate  Outcome: Progressing  Goal: Maintains/Returns to pre admission functional level  Description: INTERVENTIONS:  - Perform AM-PAC 6 Click Basic Mobility/ Daily Activity assessment daily.  - Set and communicate daily mobility goal to care team and patient/family/caregiver.   - Collaborate with rehabilitation services on mobility goals if consulted    - Out of bed for toileting  - Record patient progress and toleration of activity level   Outcome: Progressing     Problem: DISCHARGE PLANNING  Goal: Discharge to home or other facility with appropriate resources  Description: INTERVENTIONS:  - Identify barriers to discharge w/patient and caregiver  - Arrange for needed discharge resources and transportation as appropriate  - Identify discharge learning needs (meds, wound care, etc.)  - Arrange for interpretive services to assist at discharge as needed  - Refer to Case Management Department for coordinating discharge planning if the patient needs post-hospital services based on physician/advanced practitioner order or  complex needs related to functional status, cognitive ability, or social support system  Outcome: Progressing     Problem: Knowledge Deficit  Goal: Patient/family/caregiver demonstrates understanding of disease process, treatment plan, medications, and discharge instructions  Description: Complete learning assessment and assess knowledge base.  Interventions:  - Provide teaching at level of understanding  - Provide teaching via preferred learning methods  Outcome: Progressing     Problem: POSTPARTUM  Goal: Experiences normal postpartum course  Description: INTERVENTIONS:  - Monitor maternal vital signs  - Assess uterine involution and lochia  Outcome: Progressing  Goal: Appropriate maternal -  bonding  Description: INTERVENTIONS:  - Identify family support  - Assess for appropriate maternal/infant bonding   -Encourage maternal/infant bonding opportunities  - Referral to  or  as needed  Outcome: Progressing  Goal: Establishment of infant feeding pattern  Description: INTERVENTIONS:  - Assess breast/bottle feeding  - Refer to lactation as needed  Outcome: Progressing  Goal: Incision(s), wounds(s) or drain site(s) healing without S/S of infection  Description: INTERVENTIONS  - Assess and document dressing, incision, wound bed, drain sites and surrounding tissue  - Provide patient and family education    Outcome: Progressing

## 2024-10-06 NOTE — PLAN OF CARE
Problem: PAIN - ADULT  Goal: Verbalizes/displays adequate comfort level or baseline comfort level  Description: Interventions:  - Encourage patient to monitor pain and request assistance  - Assess pain using appropriate pain scale  - Administer analgesics based on type and severity of pain and evaluate response  - Implement non-pharmacological measures as appropriate and evaluate response  - Consider cultural and social influences on pain and pain management  - Notify physician/advanced practitioner if interventions unsuccessful or patient reports new pain  Outcome: Progressing     Problem: INFECTION - ADULT  Goal: Absence or prevention of progression during hospitalization  Description: INTERVENTIONS:  - Assess and monitor for signs and symptoms of infection  - Monitor lab/diagnostic results  - Monitor all insertion sites, i.e. indwelling lines, tubes, and drains  - Monitor endotracheal if appropriate and nasal secretions for changes in amount and color  - [place] appropriate cooling/warming therapies per order  - Administer medications as ordered  - Instruct and encourage patient and family to use good hand hygiene technique  - Identify and instruct in appropriate isolation precautions for identified infection/condition  Outcome: Progressing  Goal: Absence of fever/infection during neutropenic period  Description: INTERVENTIONS:  - Monitor WBC    Outcome: Progressing     Problem: SAFETY ADULT  Goal: Maintain or return to baseline ADL function  Description: INTERVENTIONS:  -  Assess patient's ability to carry out ADLs; assess patient's baseline for ADL function and identify physical deficits which impact ability to perform ADLs (bathing, care of mouth/teeth, toileting, grooming, dressing, etc.)  - Assess/evaluate cause of self-care deficits   - Assess range of motion  - Assess patient's mobility; develop plan if impaired  - Assess patient's need for assistive devices and provide as appropriate  - Encourage  maximum independence but intervene and supervise when necessary  - Involve family in performance of ADLs  - Assess for home care needs following discharge   - Consider OT consult to assist with ADL evaluation and planning for discharge  - Provide patient education as appropriate  Outcome: Progressing     Problem: DISCHARGE PLANNING  Goal: Discharge to home or other facility with appropriate resources  Description: INTERVENTIONS:  - Identify barriers to discharge w/patient and caregiver  - Arrange for needed discharge resources and transportation as appropriate  - Identify discharge learning needs (meds, wound care, etc.)  - Arrange for interpretive services to assist at discharge as needed  - Refer to Case Management Department for coordinating discharge planning if the patient needs post-hospital services based on physician/advanced practitioner order or complex needs related to functional status, cognitive ability, or social support system  Outcome: Progressing     Problem: Knowledge Deficit  Goal: Patient/family/caregiver demonstrates understanding of disease process, treatment plan, medications, and discharge instructions  Description: Complete learning assessment and assess knowledge base.  Interventions:  - Provide teaching at level of understanding  - Provide teaching via preferred learning methods  Outcome: Progressing     Problem: POSTPARTUM  Goal: Experiences normal postpartum course  Description: INTERVENTIONS:  - Monitor maternal vital signs  - Assess uterine involution and lochia  Outcome: Progressing  Goal: Appropriate maternal -  bonding  Description: INTERVENTIONS:  - Identify family support  - Assess for appropriate maternal/infant bonding   -Encourage maternal/infant bonding opportunities  - Referral to  or  as needed  Outcome: Progressing  Goal: Establishment of infant feeding pattern  Description: INTERVENTIONS:  - Assess breast/bottle feeding  - Refer to lactation  as needed  Outcome: Progressing  Goal: Incision(s), wounds(s) or drain site(s) healing without S/S of infection  Description: INTERVENTIONS  - Assess and document dressing, incision, wound bed, drain sites and surrounding tissue  - Provide patient and family education  - Perform skin care/dressing changes every day  Outcome: Progressing

## 2024-10-06 NOTE — DISCHARGE SUMMARY
Discharge Summary - OB/GYN   Name: Vinay Jason 34 y.o. female I MRN: [id number]  Unit/Bed#: -01 I Date of Admission: 10/4/2024   Date of Service: 10/6/2024 I Hospital Day: 2    ADMISSION  Patient of:  Alo Silva  Admission Date: 10/4/2024   Admitting Attending: Dr. Tiny Frias MD  Admitting Diagnoses:   Patient Active Problem List   Diagnosis    38 weeks gestation of pregnancy    Pregnancy resulting from in vitro fertilization in third trimester    Encounter for induction of labor    Intermittent palpitations    Decreased fetal movement affecting management of pregnancy in third trimester    First-degree perineal laceration, postpartum       DELIVERY  Delivery Method: Vaginal, Spontaneous   Delivery Date and Time: 10/5/2024 3:18 AM  Delivery Attending: Tiny Frias    DISCHARGE  Discharge Date: 10/06/24  Discharge Attending: Dr.Beth Sykes  Discharge Diagnosis:   Same, Delivered    Clinical course: Admission to Delivery  Vinay Jason is a 34 y.o.  who was admitted at 39w2d for IOL.    Reason for induction: decreased fetal movement  .       Induction method: pitocin    For pain control in labor, pt received epidural.  The labor course was not complicated. She progressed to complete and began pushing.    Delivery  Route of Delivery: Vaginal, Spontaneous    Anesthesia: Epidural,   QBL: Non-Surgical QBL (mL): 246        Delivery: Vaginal, Spontaneous at 10/5/2024 3:18 AM  Laceration: Perineal: 1° Repaired? Yes    Baby's Weight: 3390 g (7 lb 7.6 oz); 119.58    Apgar scores: 8  and 9  at 1 and 5 minutes, respectively    Clinical Course: Post-Delivery:  The post delivery course was unremarkable.    On the day of discharge, the patient was ambulating, voiding spontaneously, tolerating oral intake, and hemodynamically stable. She was able to reasonably perform all ADLs. She had appropriate bowel function. Pain was well-controlled. She was discharged home on postpartum/postop day #1 without  complications. Patient was instructed to follow up with her OB as an outpatient and was given appropriate warnings to call her provider with problems or concerns.    Pertinent lab findings included:   Blood type O+.     Last three Hgb values:  Lab Results   Component Value Date    HGB 10.5 (L) 10/06/2024    HGB 10.5 (L) 10/04/2024    HGB 11.1 07/19/2024        Problem-specific follow-up plans included the following:  Assessment & Plan  Encounter for induction of labor  - Admit, IVF and labs  - SVE 3/70/-3  - bedside US confirms cephalic presentation  - EFM Category 1  - TOCO: no contractions but irritability presents  - GBS negative  - desires epidural when active  - expecting girl  Intermittent palpitations  - denies symptoms at this time  - No murmur auscultated on exam today and HR 93  - initially was to be referred to cardiology, but has since increased her salt and electrolytes intake with saltier foods and gatorade, and has felt much better  - will get an ECG on this admission to serve as baseline  - will check CMP along with labs  - will check TSH and free T4 with labs  Decreased fetal movement affecting management of pregnancy in third trimester    Pregnancy resulting from in vitro fertilization in third trimester    First-degree perineal laceration, postpartum  No c/o. For D/C tomorrow    Discharge med list:       Medication List      START taking these medications     acetaminophen 325 mg tablet; Commonly known as: TYLENOL; Take 2 tablets   (650 mg total) by mouth every 6 (six) hours as needed for mild pain   docusate sodium 100 mg capsule; Commonly known as: COLACE; Take 1   capsule (100 mg total) by mouth 2 (two) times a day   ibuprofen 600 mg tablet; Commonly known as: MOTRIN; Take 1 tablet (600   mg total) by mouth every 6 (six) hours as needed for mild pain   witch hazel-glycerin topical pad; Commonly known as: TUCKS; Apply 1 Pad   topically every 4 (four) hours as needed for irritation     CONTINUE  taking these medications     PRENATAL 19 PO   UNISOM PO       Condition at discharge:   good     Disposition:   Home    Planned Readmission:   No    Discharge Statement:  I have spent a total time of 35 minutes in caring for this patient on the day of the visit/encounter. >30 minutes of time was spent on: Diagnostic results, Instructions for management, Patient and family education, Counseling / Coordination of care, Documenting in the medical record, and Reviewing / ordering tests, medicine, procedures  .

## 2024-10-06 NOTE — PLAN OF CARE
Problem: PAIN - ADULT  Goal: Verbalizes/displays adequate comfort level or baseline comfort level  Description: Interventions:  - Encourage patient to monitor pain and request assistance  - Assess pain using appropriate pain scale  - Administer analgesics based on type and severity of pain and evaluate response  - Implement non-pharmacological measures as appropriate and evaluate response  - Consider cultural and social influences on pain and pain management  - Notify physician/advanced practitioner if interventions unsuccessful or patient reports new pain  Outcome: Completed     Problem: INFECTION - ADULT  Goal: Absence or prevention of progression during hospitalization  Description: INTERVENTIONS:  - Assess and monitor for signs and symptoms of infection  - Monitor lab/diagnostic results  - Monitor all insertion sites, i.e. indwelling lines, tubes, and drains  - Monitor endotracheal if appropriate and nasal secretions for changes in amount and color  - [place] appropriate cooling/warming therapies per order  - Administer medications as ordered  - Instruct and encourage patient and family to use good hand hygiene technique  - Identify and instruct in appropriate isolation precautions for identified infection/condition  Outcome: Completed  Goal: Absence of fever/infection during neutropenic period  Description: INTERVENTIONS:  - Monitor WBC    Outcome: Completed     Problem: SAFETY ADULT  Goal: Maintain or return to baseline ADL function  Description: INTERVENTIONS:  -  Assess patient's ability to carry out ADLs; assess patient's baseline for ADL function and identify physical deficits which impact ability to perform ADLs (bathing, care of mouth/teeth, toileting, grooming, dressing, etc.)  - Assess/evaluate cause of self-care deficits   - Assess range of motion  - Assess patient's mobility; develop plan if impaired  - Assess patient's need for assistive devices and provide as appropriate  - Encourage maximum  independence but intervene and supervise when necessary  - Involve family in performance of ADLs  - Assess for home care needs following discharge   - Consider OT consult to assist with ADL evaluation and planning for discharge  - Provide patient education as appropriate  Outcome: Completed     Problem: DISCHARGE PLANNING  Goal: Discharge to home or other facility with appropriate resources  Description: INTERVENTIONS:  - Identify barriers to discharge w/patient and caregiver  - Arrange for needed discharge resources and transportation as appropriate  - Identify discharge learning needs (meds, wound care, etc.)  - Arrange for interpretive services to assist at discharge as needed  - Refer to Case Management Department for coordinating discharge planning if the patient needs post-hospital services based on physician/advanced practitioner order or complex needs related to functional status, cognitive ability, or social support system  Outcome: Completed     Problem: Knowledge Deficit  Goal: Patient/family/caregiver demonstrates understanding of disease process, treatment plan, medications, and discharge instructions  Description: Complete learning assessment and assess knowledge base.  Interventions:  - Provide teaching at level of understanding  - Provide teaching via preferred learning methods  Outcome: Completed     Problem: POSTPARTUM  Goal: Experiences normal postpartum course  Description: INTERVENTIONS:  - Monitor maternal vital signs  - Assess uterine involution and lochia  Outcome: Completed  Goal: Appropriate maternal -  bonding  Description: INTERVENTIONS:  - Identify family support  - Assess for appropriate maternal/infant bonding   -Encourage maternal/infant bonding opportunities  - Referral to  or  as needed  Outcome: Completed  Goal: Establishment of infant feeding pattern  Description: INTERVENTIONS:  - Assess breast/bottle feeding  - Refer to lactation as  needed  Outcome: Completed  Goal: Incision(s), wounds(s) or drain site(s) healing without S/S of infection  Description: INTERVENTIONS  - Assess and document dressing, incision, wound bed, drain sites and surrounding tissue  - Provide patient and family education  - Perform skin care/dressing changes every day  Outcome: Completed

## 2024-10-10 ENCOUNTER — TELEPHONE (OUTPATIENT)
Dept: OBGYN CLINIC | Facility: CLINIC | Age: 34
End: 2024-10-10

## 2024-10-14 LAB — PLACENTA IN STORAGE: NORMAL

## 2024-10-31 ENCOUNTER — TELEPHONE (OUTPATIENT)
Dept: OBGYN CLINIC | Facility: CLINIC | Age: 34
End: 2024-10-31

## 2024-10-31 NOTE — TELEPHONE ENCOUNTER
POSTPARTUM PHONE CALL ASSESSMENT    Date of Delivery: 10/05/2024 has not scheduled PP visit. Left a message   Delivering Provider: Dr. Frias   Mode: -admitted for IOL for decreased fetal movement   The labor course was not complicated.   Delivery Notes/Complications:    -admitted for IOL for decreased fetal movement   The labor course was not complicated.    Do you still have bleeding/pain? If so, how much/how severe?    Regular BMs/Urination?   Breastfeeding/Formula/Both?   How are you doing emotionally?    If struggling, obtain a EPDS Score:   Do you have any other questions or concerns for us or your provider?   Have you scheduled the pediatrician appointment with pediatrician?   Do you have a postpartum visit scheduled? NO   Date scheduled:  Provider:   Attempted to contact patient, voicemail received, left a message to call back and need to schedule a Post partum visit.